# Patient Record
Sex: FEMALE | Race: WHITE | NOT HISPANIC OR LATINO | URBAN - METROPOLITAN AREA
[De-identification: names, ages, dates, MRNs, and addresses within clinical notes are randomized per-mention and may not be internally consistent; named-entity substitution may affect disease eponyms.]

---

## 2019-02-08 ENCOUNTER — LAB REQUISITION (OUTPATIENT)
Dept: LAB | Facility: HOSPITAL | Age: 29
End: 2019-02-08

## 2019-02-08 DIAGNOSIS — Z00.00 ROUTINE GENERAL MEDICAL EXAMINATION AT A HEALTH CARE FACILITY: ICD-10-CM

## 2019-02-08 LAB
ALP SERPL-CCNC: 175 U/L (ref 25–100)
ALT SERPL W P-5'-P-CCNC: 19 U/L (ref 7–40)
AST SERPL-CCNC: 26 U/L (ref 0–33)
BASOPHILS # BLD AUTO: 0.03 10*3/MM3 (ref 0–0.2)
BASOPHILS NFR BLD AUTO: 0.5 % (ref 0–1)
BILIRUB SERPL-MCNC: 0.4 MG/DL (ref 0.3–1.2)
CREAT BLD-MCNC: 0.71 MG/DL (ref 0.6–1.3)
DEPRECATED RDW RBC AUTO: 40.9 FL (ref 37–54)
EOSINOPHIL # BLD AUTO: 0.12 10*3/MM3 (ref 0–0.3)
EOSINOPHIL NFR BLD AUTO: 1.9 % (ref 0–3)
ERYTHROCYTE [DISTWIDTH] IN BLOOD BY AUTOMATED COUNT: 12.7 % (ref 11.3–14.5)
GFR SERPL CREATININE-BSD FRML MDRD: 120 ML/MIN/1.73
GFR SERPL CREATININE-BSD FRML MDRD: 99 ML/MIN/1.73
HCT VFR BLD AUTO: 39 % (ref 34.5–44)
HGB BLD-MCNC: 12.9 G/DL (ref 11.5–15.5)
IMM GRANULOCYTES # BLD AUTO: 0.02 10*3/MM3 (ref 0–0.03)
IMM GRANULOCYTES NFR BLD AUTO: 0.3 % (ref 0–0.6)
LDH SERPL-CCNC: 215 U/L (ref 120–246)
LYMPHOCYTES # BLD AUTO: 1.33 10*3/MM3 (ref 0.6–4.8)
LYMPHOCYTES NFR BLD AUTO: 20.7 % (ref 24–44)
MCH RBC QN AUTO: 29.4 PG (ref 27–31)
MCHC RBC AUTO-ENTMCNC: 33.1 G/DL (ref 32–36)
MCV RBC AUTO: 88.8 FL (ref 80–99)
MONOCYTES # BLD AUTO: 0.93 10*3/MM3 (ref 0–1)
MONOCYTES NFR BLD AUTO: 14.5 % (ref 0–12)
NEUTROPHILS # BLD AUTO: 4.01 10*3/MM3 (ref 1.5–8.3)
NEUTROPHILS NFR BLD AUTO: 62.4 % (ref 41–71)
PLATELET # BLD AUTO: 197 10*3/MM3 (ref 150–450)
PMV BLD AUTO: 12.1 FL (ref 6–12)
RBC # BLD AUTO: 4.39 10*6/MM3 (ref 3.89–5.14)
URATE SERPL-MCNC: 6.5 MG/DL (ref 3.1–7.8)
WBC NRBC COR # BLD: 6.42 10*3/MM3 (ref 3.5–10.8)

## 2019-02-08 PROCEDURE — 84450 TRANSFERASE (AST) (SGOT): CPT

## 2019-02-08 PROCEDURE — 84460 ALANINE AMINO (ALT) (SGPT): CPT

## 2019-02-08 PROCEDURE — 84550 ASSAY OF BLOOD/URIC ACID: CPT

## 2019-02-08 PROCEDURE — 85025 COMPLETE CBC W/AUTO DIFF WBC: CPT

## 2019-02-08 PROCEDURE — 82565 ASSAY OF CREATININE: CPT

## 2019-02-08 PROCEDURE — 83615 LACTATE (LD) (LDH) ENZYME: CPT

## 2019-02-08 PROCEDURE — 82247 BILIRUBIN TOTAL: CPT

## 2019-02-08 PROCEDURE — 84075 ASSAY ALKALINE PHOSPHATASE: CPT

## 2019-02-21 ENCOUNTER — ANESTHESIA EVENT (OUTPATIENT)
Dept: LABOR AND DELIVERY | Facility: HOSPITAL | Age: 29
End: 2019-02-21

## 2019-02-21 ENCOUNTER — HOSPITAL ENCOUNTER (INPATIENT)
Facility: HOSPITAL | Age: 29
LOS: 4 days | Discharge: HOME | End: 2019-02-25
Attending: OBSTETRICS & GYNECOLOGY | Admitting: OBSTETRICS & GYNECOLOGY

## 2019-02-21 ENCOUNTER — ANESTHESIA (OUTPATIENT)
Dept: LABOR AND DELIVERY | Facility: HOSPITAL | Age: 29
End: 2019-02-21

## 2019-02-21 PROBLEM — Z34.90 CURRENTLY PREGNANT: Status: ACTIVE | Noted: 2019-02-21

## 2019-02-21 LAB
ABO GROUP BLD: NORMAL
ALP SERPL-CCNC: 249 U/L (ref 25–100)
ALT SERPL W P-5'-P-CCNC: 56 U/L (ref 7–40)
AST SERPL-CCNC: 53 U/L (ref 0–33)
ATMOSPHERIC PRESS: ABNORMAL MMHG
ATMOSPHERIC PRESS: ABNORMAL MMHG
BASE EXCESS BLDCOA CALC-SCNC: -1.3 MMOL/L (ref 0–2)
BASE EXCESS BLDCOV CALC-SCNC: -1.8 MMOL/L (ref 0–2)
BDY SITE: ABNORMAL
BDY SITE: ABNORMAL
BILIRUB SERPL-MCNC: 0.4 MG/DL (ref 0.3–1.2)
BLD GP AB SCN SERPL QL: NEGATIVE
BODY TEMPERATURE: 37 C
BODY TEMPERATURE: 37 C
CO2 BLDA-SCNC: 28.3 MMOL/L (ref 23–27)
CO2 BLDA-SCNC: 29.9 MMOL/L (ref 23–27)
CREAT BLD-MCNC: 0.73 MG/DL (ref 0.6–1.3)
DEPRECATED RDW RBC AUTO: 39.7 FL (ref 37–54)
EPAP: 0
EPAP: 0
ERYTHROCYTE [DISTWIDTH] IN BLOOD BY AUTOMATED COUNT: 12.5 % (ref 11.3–14.5)
HCO3 BLDCOA-SCNC: 26.6 MMOL/L (ref 16.9–20.5)
HCO3 BLDCOV-SCNC: 27.9 MMOL/L (ref 18.6–21.4)
HCT VFR BLD AUTO: 40.9 % (ref 34.5–44)
HGB BLD-MCNC: 13.8 G/DL (ref 11.5–15.5)
HGB BLDA-MCNC: 15.4 G/DL (ref 14–18)
HGB BLDA-MCNC: 19.4 G/DL (ref 14–18)
HOROWITZ INDEX BLD+IHG-RTO: 21 %
HOROWITZ INDEX BLD+IHG-RTO: 21 %
IPAP: 0
IPAP: 0
LDH SERPL-CCNC: 209 U/L (ref 120–246)
Lab: ABNORMAL
MCH RBC QN AUTO: 29.4 PG (ref 27–31)
MCHC RBC AUTO-ENTMCNC: 33.7 G/DL (ref 32–36)
MCV RBC AUTO: 87.2 FL (ref 80–99)
MODALITY: ABNORMAL
MODALITY: ABNORMAL
NOTE: ABNORMAL
NOTE: ABNORMAL
NOTIFIED BY: ABNORMAL
NOTIFIED WHO: ABNORMAL
PAW @ PEAK INSP FLOW SETTING VENT: 0 CMH2O
PAW @ PEAK INSP FLOW SETTING VENT: 0 CMH2O
PCO2 BLDCOA: 56 MMHG (ref 43.3–54.9)
PCO2 BLDCOV: 65.1 MM HG
PH BLDCOA: 7.28 PH UNITS (ref 7.22–7.3)
PH BLDCOV: 7.24 PH UNITS
PLATELET # BLD AUTO: 226 10*3/MM3 (ref 150–450)
PMV BLD AUTO: 11.9 FL (ref 6–12)
PO2 BLDCOA: 17.3 MMHG (ref 11.5–43.3)
PO2 BLDCOV: 12.3 MM HG
RBC # BLD AUTO: 4.69 10*6/MM3 (ref 3.89–5.14)
RH BLD: POSITIVE
SAO2 % BLDCOA: 28 %
SAO2 % BLDCOA: ABNORMAL % (ref 92–98)
SAO2 % BLDCOV: 9.1 %
T&S EXPIRATION DATE: NORMAL
TOTAL RATE: 0 BREATHS/MINUTE
TOTAL RATE: 0 BREATHS/MINUTE
URATE SERPL-MCNC: 7.6 MG/DL (ref 3.1–7.8)
WBC NRBC COR # BLD: 5.94 10*3/MM3 (ref 3.5–10.8)

## 2019-02-21 PROCEDURE — 86900 BLOOD TYPING SEROLOGIC ABO: CPT | Performed by: OBSTETRICS & GYNECOLOGY

## 2019-02-21 PROCEDURE — 84450 TRANSFERASE (AST) (SGOT): CPT | Performed by: OBSTETRICS & GYNECOLOGY

## 2019-02-21 PROCEDURE — 84460 ALANINE AMINO (ALT) (SGPT): CPT | Performed by: OBSTETRICS & GYNECOLOGY

## 2019-02-21 PROCEDURE — 84075 ASSAY ALKALINE PHOSPHATASE: CPT | Performed by: OBSTETRICS & GYNECOLOGY

## 2019-02-21 PROCEDURE — 85027 COMPLETE CBC AUTOMATED: CPT | Performed by: OBSTETRICS & GYNECOLOGY

## 2019-02-21 PROCEDURE — 86900 BLOOD TYPING SEROLOGIC ABO: CPT

## 2019-02-21 PROCEDURE — 25010000003 MORPHINE PER 10 MG: Performed by: ANESTHESIOLOGY

## 2019-02-21 PROCEDURE — 25010000002 ONDANSETRON PER 1 MG: Performed by: OBSTETRICS & GYNECOLOGY

## 2019-02-21 PROCEDURE — 25010000002 MAGNESIUM SULFATE 2 GM/50ML SOLUTION: Performed by: OBSTETRICS & GYNECOLOGY

## 2019-02-21 PROCEDURE — 88307 TISSUE EXAM BY PATHOLOGIST: CPT | Performed by: OBSTETRICS & GYNECOLOGY

## 2019-02-21 PROCEDURE — 25010000003 CEFAZOLIN IN DEXTROSE 2-4 GM/100ML-% SOLUTION: Performed by: OBSTETRICS & GYNECOLOGY

## 2019-02-21 PROCEDURE — 25010000002 FENTANYL CITRATE (PF) 100 MCG/2ML SOLUTION: Performed by: ANESTHESIOLOGY

## 2019-02-21 PROCEDURE — 25010000002 METOCLOPRAMIDE PER 10 MG: Performed by: ANESTHESIOLOGY

## 2019-02-21 PROCEDURE — 59025 FETAL NON-STRESS TEST: CPT

## 2019-02-21 PROCEDURE — 83615 LACTATE (LD) (LDH) ENZYME: CPT | Performed by: OBSTETRICS & GYNECOLOGY

## 2019-02-21 PROCEDURE — 86850 RBC ANTIBODY SCREEN: CPT | Performed by: OBSTETRICS & GYNECOLOGY

## 2019-02-21 PROCEDURE — 82565 ASSAY OF CREATININE: CPT | Performed by: OBSTETRICS & GYNECOLOGY

## 2019-02-21 PROCEDURE — 84550 ASSAY OF BLOOD/URIC ACID: CPT | Performed by: OBSTETRICS & GYNECOLOGY

## 2019-02-21 PROCEDURE — 86901 BLOOD TYPING SEROLOGIC RH(D): CPT | Performed by: OBSTETRICS & GYNECOLOGY

## 2019-02-21 PROCEDURE — 86901 BLOOD TYPING SEROLOGIC RH(D): CPT

## 2019-02-21 PROCEDURE — 82805 BLOOD GASES W/O2 SATURATION: CPT

## 2019-02-21 PROCEDURE — 6A550ZT PHERESIS OF CORD BLOOD STEM CELLS, SINGLE: ICD-10-PCS | Performed by: OBSTETRICS & GYNECOLOGY

## 2019-02-21 PROCEDURE — 82247 BILIRUBIN TOTAL: CPT | Performed by: OBSTETRICS & GYNECOLOGY

## 2019-02-21 RX ORDER — PRENATAL WITH FERROUS FUM AND FOLIC ACID 3080; 920; 120; 400; 22; 1.84; 3; 20; 10; 1; 12; 200; 27; 25; 2 [IU]/1; [IU]/1; MG/1; [IU]/1; MG/1; MG/1; MG/1; MG/1; MG/1; MG/1; UG/1; MG/1; MG/1; MG/1; MG/1
1 TABLET ORAL DAILY
COMMUNITY

## 2019-02-21 RX ORDER — METOCLOPRAMIDE HYDROCHLORIDE 5 MG/ML
10 INJECTION INTRAMUSCULAR; INTRAVENOUS ONCE AS NEEDED
Status: DISCONTINUED | OUTPATIENT
Start: 2019-02-21 | End: 2019-02-25 | Stop reason: HOSPADM

## 2019-02-21 RX ORDER — ONDANSETRON 2 MG/ML
4 INJECTION INTRAMUSCULAR; INTRAVENOUS ONCE
Status: DISCONTINUED | OUTPATIENT
Start: 2019-02-22 | End: 2019-02-25 | Stop reason: HOSPADM

## 2019-02-21 RX ORDER — ONDANSETRON 2 MG/ML
4 INJECTION INTRAMUSCULAR; INTRAVENOUS EVERY 6 HOURS PRN
Status: DISCONTINUED | OUTPATIENT
Start: 2019-02-21 | End: 2019-02-25 | Stop reason: HOSPADM

## 2019-02-21 RX ORDER — CEFAZOLIN SODIUM 2 G/100ML
2 INJECTION, SOLUTION INTRAVENOUS ONCE
Status: COMPLETED | OUTPATIENT
Start: 2019-02-21 | End: 2019-02-21

## 2019-02-21 RX ORDER — ACETAMINOPHEN 160 MG/5ML
1000 SOLUTION ORAL EVERY 6 HOURS PRN
Status: DISCONTINUED | OUTPATIENT
Start: 2019-02-21 | End: 2019-02-21

## 2019-02-21 RX ORDER — DEXTROSE AND SODIUM CHLORIDE 5; .2 G/100ML; G/100ML
96 INJECTION, SOLUTION INTRAVENOUS CONTINUOUS
Status: DISCONTINUED | OUTPATIENT
Start: 2019-02-21 | End: 2019-02-25 | Stop reason: HOSPADM

## 2019-02-21 RX ORDER — IBUPROFEN 600 MG/1
600 TABLET ORAL ONCE AS NEEDED
Status: COMPLETED | OUTPATIENT
Start: 2019-02-21 | End: 2019-02-22

## 2019-02-21 RX ORDER — MAGNESIUM SULFATE HEPTAHYDRATE 40 MG/ML
2 INJECTION, SOLUTION INTRAVENOUS CONTINUOUS
Status: DISPENSED | OUTPATIENT
Start: 2019-02-21 | End: 2019-02-24

## 2019-02-21 RX ORDER — FENTANYL CITRATE 50 UG/ML
INJECTION, SOLUTION INTRAMUSCULAR; INTRAVENOUS
Status: COMPLETED | OUTPATIENT
Start: 2019-02-21 | End: 2019-02-21

## 2019-02-21 RX ORDER — MAGNESIUM SULF/RINGERS LACTATE 40 G/500ML
PLASTIC BAG, INJECTION (ML) INTRAVENOUS
Status: COMPLETED
Start: 2019-02-21 | End: 2019-02-21

## 2019-02-21 RX ORDER — OXYTOCIN 10 [USP'U]/ML
INJECTION, SOLUTION INTRAMUSCULAR; INTRAVENOUS AS NEEDED
Status: DISCONTINUED | OUTPATIENT
Start: 2019-02-21 | End: 2019-02-21 | Stop reason: SURG

## 2019-02-21 RX ORDER — METOCLOPRAMIDE HYDROCHLORIDE 5 MG/ML
INJECTION INTRAMUSCULAR; INTRAVENOUS AS NEEDED
Status: DISCONTINUED | OUTPATIENT
Start: 2019-02-21 | End: 2019-02-21 | Stop reason: SURG

## 2019-02-21 RX ORDER — ACETAMINOPHEN 325 MG/1
650 TABLET ORAL ONCE AS NEEDED
Status: COMPLETED | OUTPATIENT
Start: 2019-02-21 | End: 2019-02-22

## 2019-02-21 RX ORDER — MORPHINE SULFATE 0.5 MG/ML
INJECTION, SOLUTION EPIDURAL; INTRATHECAL; INTRAVENOUS
Status: COMPLETED | OUTPATIENT
Start: 2019-02-21 | End: 2019-02-21

## 2019-02-21 RX ORDER — HYDROMORPHONE HYDROCHLORIDE 1 MG/ML
0.5 INJECTION, SOLUTION INTRAMUSCULAR; INTRAVENOUS; SUBCUTANEOUS
Status: ACTIVE | OUTPATIENT
Start: 2019-02-21 | End: 2019-02-22

## 2019-02-21 RX ORDER — LABETALOL 200 MG/1
200 TABLET, FILM COATED ORAL 3 TIMES DAILY
COMMUNITY
End: 2019-02-25 | Stop reason: HOSPADM

## 2019-02-21 RX ORDER — FAMOTIDINE 10 MG/ML
20 INJECTION, SOLUTION INTRAVENOUS 2 TIMES DAILY PRN
Status: DISCONTINUED | OUTPATIENT
Start: 2019-02-21 | End: 2019-02-25 | Stop reason: HOSPADM

## 2019-02-21 RX ORDER — ACETAMINOPHEN 500 MG
1000 TABLET ORAL EVERY 6 HOURS PRN
Status: DISCONTINUED | OUTPATIENT
Start: 2019-02-21 | End: 2019-02-25 | Stop reason: HOSPADM

## 2019-02-21 RX ORDER — LABETALOL HYDROCHLORIDE 5 MG/ML
20-80 INJECTION, SOLUTION INTRAVENOUS
Status: ACTIVE | OUTPATIENT
Start: 2019-02-21 | End: 2019-02-22

## 2019-02-21 RX ORDER — LABETALOL HYDROCHLORIDE 5 MG/ML
INJECTION, SOLUTION INTRAVENOUS
Status: COMPLETED
Start: 2019-02-21 | End: 2019-02-21

## 2019-02-21 RX ORDER — SODIUM CHLORIDE, SODIUM LACTATE, POTASSIUM CHLORIDE, CALCIUM CHLORIDE 600; 310; 30; 20 MG/100ML; MG/100ML; MG/100ML; MG/100ML
1000 INJECTION, SOLUTION INTRAVENOUS ONCE
Status: COMPLETED | OUTPATIENT
Start: 2019-02-21 | End: 2019-02-21

## 2019-02-21 RX ORDER — SODIUM CHLORIDE, SODIUM LACTATE, POTASSIUM CHLORIDE, CALCIUM CHLORIDE 600; 310; 30; 20 MG/100ML; MG/100ML; MG/100ML; MG/100ML
INJECTION, SOLUTION INTRAVENOUS CONTINUOUS PRN
Status: DISCONTINUED | OUTPATIENT
Start: 2019-02-21 | End: 2019-02-21 | Stop reason: SURG

## 2019-02-21 RX ORDER — RANITIDINE HCL 75 MG
75 TABLET ORAL 2 TIMES DAILY
COMMUNITY
End: 2019-02-25 | Stop reason: HOSPADM

## 2019-02-21 RX ORDER — BUPIVACAINE HYDROCHLORIDE 7.5 MG/ML
INJECTION, SOLUTION EPIDURAL; RETROBULBAR
Status: COMPLETED | OUTPATIENT
Start: 2019-02-21 | End: 2019-02-21

## 2019-02-21 RX ADMIN — FAMOTIDINE 20 MG: 10 INJECTION, SOLUTION INTRAVENOUS at 19:52

## 2019-02-21 RX ADMIN — SODIUM CITRATE AND CITRIC ACID MONOHYDRATE 30 ML: 500; 334 SOLUTION ORAL at 19:29

## 2019-02-21 RX ADMIN — LABETALOL HYDROCHLORIDE 20 MG: 5 INJECTION INTRAVENOUS at 18:21

## 2019-02-21 RX ADMIN — DEXTROSE AND SODIUM CHLORIDE 96 ML/HR: 5; 200 INJECTION, SOLUTION INTRAVENOUS at 18:18

## 2019-02-21 RX ADMIN — LABETALOL HYDROCHLORIDE 20 MG: 5 INJECTION INTRAVENOUS at 20:15

## 2019-02-21 RX ADMIN — BUPIVACAINE HYDROCHLORIDE 1.6 ML: 7.5 INJECTION, SOLUTION EPIDURAL; RETROBULBAR at 21:57

## 2019-02-21 RX ADMIN — FENTANYL CITRATE 20 MCG: 50 INJECTION, SOLUTION INTRAMUSCULAR; INTRAVENOUS at 21:57

## 2019-02-21 RX ADMIN — MORPHINE SULFATE 0.2 MG: 0.5 INJECTION, SOLUTION EPIDURAL; INTRATHECAL; INTRAVENOUS at 21:57

## 2019-02-21 RX ADMIN — MAGNESIUM SULFATE HEPTAHYDRATE 2 G: 40 INJECTION, SOLUTION INTRAVENOUS at 18:55

## 2019-02-21 RX ADMIN — ACETAMINOPHEN 1000 MG: 500 TABLET, FILM COATED ORAL at 19:52

## 2019-02-21 RX ADMIN — METOCLOPRAMIDE 10 MG: 5 INJECTION, SOLUTION INTRAMUSCULAR; INTRAVENOUS at 22:02

## 2019-02-21 RX ADMIN — ONDANSETRON 4 MG: 2 INJECTION INTRAMUSCULAR; INTRAVENOUS at 22:02

## 2019-02-21 RX ADMIN — OXYTOCIN 20 UNITS: 10 INJECTION, SOLUTION INTRAMUSCULAR; INTRAVENOUS at 22:25

## 2019-02-21 RX ADMIN — SODIUM CHLORIDE, POTASSIUM CHLORIDE, SODIUM LACTATE AND CALCIUM CHLORIDE 1000 ML: 600; 310; 30; 20 INJECTION, SOLUTION INTRAVENOUS at 21:20

## 2019-02-21 RX ADMIN — LABETALOL HYDROCHLORIDE 40 MG: 5 INJECTION INTRAVENOUS at 18:32

## 2019-02-21 RX ADMIN — CEFAZOLIN SODIUM 2 G: 2 INJECTION, SOLUTION INTRAVENOUS at 19:28

## 2019-02-21 RX ADMIN — LABETALOL HYDROCHLORIDE 40 MG: 5 INJECTION INTRAVENOUS at 20:28

## 2019-02-21 RX ADMIN — FAMOTIDINE 20 MG: 10 INJECTION, SOLUTION INTRAVENOUS at 22:02

## 2019-02-21 RX ADMIN — ONDANSETRON 4 MG: 2 INJECTION INTRAMUSCULAR; INTRAVENOUS at 19:52

## 2019-02-21 RX ADMIN — OXYTOCIN 20 UNITS: 10 INJECTION, SOLUTION INTRAMUSCULAR; INTRAVENOUS at 22:13

## 2019-02-21 RX ADMIN — Medication 4 G: at 18:40

## 2019-02-21 RX ADMIN — SODIUM CHLORIDE, POTASSIUM CHLORIDE, SODIUM LACTATE AND CALCIUM CHLORIDE: 600; 310; 30; 20 INJECTION, SOLUTION INTRAVENOUS at 21:45

## 2019-02-22 LAB
BASOPHILS # BLD AUTO: 0.03 10*3/MM3 (ref 0–0.2)
BASOPHILS NFR BLD AUTO: 0.4 % (ref 0–1)
DEPRECATED RDW RBC AUTO: 39.5 FL (ref 37–54)
EOSINOPHIL # BLD AUTO: 0.06 10*3/MM3 (ref 0–0.3)
EOSINOPHIL NFR BLD AUTO: 0.7 % (ref 0–3)
ERYTHROCYTE [DISTWIDTH] IN BLOOD BY AUTOMATED COUNT: 12.3 % (ref 11.3–14.5)
HCT VFR BLD AUTO: 37.2 % (ref 34.5–44)
HGB BLD-MCNC: 12.8 G/DL (ref 11.5–15.5)
IMM GRANULOCYTES # BLD AUTO: 0.01 10*3/MM3 (ref 0–0.05)
IMM GRANULOCYTES NFR BLD AUTO: 0.1 % (ref 0–0.6)
LYMPHOCYTES # BLD AUTO: 2.24 10*3/MM3 (ref 0.6–4.8)
LYMPHOCYTES NFR BLD AUTO: 28 % (ref 24–44)
MCH RBC QN AUTO: 29.7 PG (ref 27–31)
MCHC RBC AUTO-ENTMCNC: 34.4 G/DL (ref 32–36)
MCV RBC AUTO: 86.3 FL (ref 80–99)
MONOCYTES # BLD AUTO: 0.57 10*3/MM3 (ref 0–1)
MONOCYTES NFR BLD AUTO: 7.1 % (ref 0–12)
NEUTROPHILS # BLD AUTO: 5.1 10*3/MM3 (ref 1.5–8.3)
NEUTROPHILS NFR BLD AUTO: 63.7 % (ref 41–71)
PLATELET # BLD AUTO: 215 10*3/MM3 (ref 150–450)
PMV BLD AUTO: 11.5 FL (ref 6–12)
RBC # BLD AUTO: 4.31 10*6/MM3 (ref 3.89–5.14)
WBC NRBC COR # BLD: 8.01 10*3/MM3 (ref 3.5–10.8)

## 2019-02-22 PROCEDURE — 85025 COMPLETE CBC W/AUTO DIFF WBC: CPT | Performed by: OBSTETRICS & GYNECOLOGY

## 2019-02-22 PROCEDURE — 25010000002 MAGNESIUM SULFATE 2 GM/50ML SOLUTION: Performed by: OBSTETRICS & GYNECOLOGY

## 2019-02-22 RX ORDER — SIMETHICONE 80 MG
80 TABLET,CHEWABLE ORAL
Status: DISCONTINUED | OUTPATIENT
Start: 2019-02-22 | End: 2019-02-25 | Stop reason: HOSPADM

## 2019-02-22 RX ORDER — MAGNESIUM SULF/RINGERS LACTATE 40 G/500ML
PLASTIC BAG, INJECTION (ML) INTRAVENOUS
Status: DISPENSED
Start: 2019-02-22 | End: 2019-02-23

## 2019-02-22 RX ORDER — LABETALOL 200 MG/1
200 TABLET, FILM COATED ORAL EVERY 8 HOURS SCHEDULED
Status: DISCONTINUED | OUTPATIENT
Start: 2019-02-22 | End: 2019-02-23

## 2019-02-22 RX ORDER — SODIUM CHLORIDE 0.9 % (FLUSH) 0.9 %
3-10 SYRINGE (ML) INJECTION AS NEEDED
Status: DISCONTINUED | OUTPATIENT
Start: 2019-02-22 | End: 2019-02-25 | Stop reason: HOSPADM

## 2019-02-22 RX ORDER — DOCUSATE SODIUM 100 MG/1
100 CAPSULE, LIQUID FILLED ORAL 2 TIMES DAILY PRN
Status: DISCONTINUED | OUTPATIENT
Start: 2019-02-22 | End: 2019-02-25 | Stop reason: HOSPADM

## 2019-02-22 RX ORDER — LANOLIN 100 %
OINTMENT (GRAM) TOPICAL
Status: DISCONTINUED | OUTPATIENT
Start: 2019-02-22 | End: 2019-02-25 | Stop reason: HOSPADM

## 2019-02-22 RX ORDER — NALOXONE HCL 0.4 MG/ML
0.4 VIAL (ML) INJECTION
Status: ACTIVE | OUTPATIENT
Start: 2019-02-22 | End: 2019-02-23

## 2019-02-22 RX ORDER — OXYCODONE HYDROCHLORIDE AND ACETAMINOPHEN 5; 325 MG/1; MG/1
1 TABLET ORAL EVERY 4 HOURS PRN
Status: DISCONTINUED | OUTPATIENT
Start: 2019-02-22 | End: 2019-02-25 | Stop reason: HOSPADM

## 2019-02-22 RX ORDER — PROMETHAZINE HYDROCHLORIDE 25 MG/ML
12.5 INJECTION, SOLUTION INTRAMUSCULAR; INTRAVENOUS EVERY 6 HOURS PRN
Status: DISCONTINUED | OUTPATIENT
Start: 2019-02-22 | End: 2019-02-25 | Stop reason: HOSPADM

## 2019-02-22 RX ORDER — PROMETHAZINE HYDROCHLORIDE 12.5 MG/1
12.5 SUPPOSITORY RECTAL EVERY 6 HOURS PRN
Status: DISCONTINUED | OUTPATIENT
Start: 2019-02-22 | End: 2019-02-25 | Stop reason: HOSPADM

## 2019-02-22 RX ORDER — LABETALOL 200 MG/1
200 TABLET, FILM COATED ORAL EVERY 8 HOURS SCHEDULED
Status: DISCONTINUED | OUTPATIENT
Start: 2019-02-22 | End: 2019-02-22

## 2019-02-22 RX ORDER — TRISODIUM CITRATE DIHYDRATE AND CITRIC ACID MONOHYDRATE 500; 334 MG/5ML; MG/5ML
30 SOLUTION ORAL ONCE
Status: COMPLETED | OUTPATIENT
Start: 2019-02-22 | End: 2019-02-21

## 2019-02-22 RX ORDER — PROMETHAZINE HYDROCHLORIDE 25 MG/1
25 TABLET ORAL EVERY 6 HOURS PRN
Status: DISCONTINUED | OUTPATIENT
Start: 2019-02-22 | End: 2019-02-25 | Stop reason: HOSPADM

## 2019-02-22 RX ORDER — IBUPROFEN 600 MG/1
600 TABLET ORAL EVERY 6 HOURS PRN
Status: DISCONTINUED | OUTPATIENT
Start: 2019-02-22 | End: 2019-02-25 | Stop reason: HOSPADM

## 2019-02-22 RX ORDER — SODIUM CHLORIDE 0.9 % (FLUSH) 0.9 %
3 SYRINGE (ML) INJECTION EVERY 12 HOURS SCHEDULED
Status: DISCONTINUED | OUTPATIENT
Start: 2019-02-22 | End: 2019-02-25 | Stop reason: HOSPADM

## 2019-02-22 RX ADMIN — MAGNESIUM SULFATE HEPTAHYDRATE 2 G: 40 INJECTION, SOLUTION INTRAVENOUS at 13:44

## 2019-02-22 RX ADMIN — IBUPROFEN 600 MG: 600 TABLET ORAL at 07:52

## 2019-02-22 RX ADMIN — LABETALOL HYDROCHLORIDE 20 MG: 5 INJECTION INTRAVENOUS at 15:11

## 2019-02-22 RX ADMIN — DEXTROSE AND SODIUM CHLORIDE 96 ML/HR: 5; 200 INJECTION, SOLUTION INTRAVENOUS at 11:22

## 2019-02-22 RX ADMIN — OXYCODONE AND ACETAMINOPHEN 1 TABLET: 5; 325 TABLET ORAL at 03:03

## 2019-02-22 RX ADMIN — LABETALOL HYDROCHLORIDE 200 MG: 200 TABLET, FILM COATED ORAL at 14:27

## 2019-02-22 RX ADMIN — DEXTROSE AND SODIUM CHLORIDE 96 ML/HR: 5; 200 INJECTION, SOLUTION INTRAVENOUS at 23:11

## 2019-02-22 RX ADMIN — ACETAMINOPHEN 650 MG: 325 TABLET ORAL at 12:29

## 2019-02-22 RX ADMIN — LABETALOL HYDROCHLORIDE 200 MG: 200 TABLET, FILM COATED ORAL at 23:17

## 2019-02-22 RX ADMIN — SIMETHICONE 80 MG: 80 TABLET, CHEWABLE ORAL at 07:50

## 2019-02-22 RX ADMIN — SIMETHICONE 80 MG: 80 TABLET, CHEWABLE ORAL at 12:30

## 2019-02-22 RX ADMIN — SIMETHICONE 80 MG: 80 TABLET, CHEWABLE ORAL at 21:12

## 2019-02-22 RX ADMIN — DEXTROSE AND SODIUM CHLORIDE 96 ML/HR: 5; 200 INJECTION, SOLUTION INTRAVENOUS at 01:20

## 2019-02-22 NOTE — ANESTHESIA POSTPROCEDURE EVALUATION
Patient: Gregoria Miguel    Procedure Summary     Date:  19 Room / Location:  [facility] LABOR DELIVERY   [facility]    Anesthesia Start:   Anesthesia Stop:      Procedure:   SECTION REPEAT (N/A Abdomen) Diagnosis:      Surgeon:  Kaylee Mendoza MD Provider:  Saturnino Cruz MD    Anesthesia Type:  spinal ASA Status:  3 - Emergent          Anesthesia Type: spinal  Last vitals  BP   122/64 (19)   Temp   97.5 °F (36.4 °C) (19)   Pulse   66 (19)   Resp   16 (19)     SpO2   98 % (19)     Post Anesthesia Care and Evaluation    Patient location during evaluation: bedside  Patient participation: complete - patient participated  Level of consciousness: awake and alert  Pain score: 0  Pain management: adequate  Airway patency: patent  Anesthetic complications: No anesthetic complications    Cardiovascular status: acceptable  Respiratory status: acceptable  Hydration status: acceptable

## 2019-02-22 NOTE — ANESTHESIA PREPROCEDURE EVALUATION
Anesthesia Evaluation     Patient summary reviewed and Nursing notes reviewed                Airway   Mallampati: I  TM distance: >3 FB  Neck ROM: full  No difficulty expected  Dental - normal exam     Pulmonary - negative pulmonary ROS and normal exam   Cardiovascular - negative cardio ROS and normal exam        Neuro/Psych- negative ROS  GI/Hepatic/Renal/Endo - negative ROS     Musculoskeletal (-) negative ROS    Abdominal  - normal exam    Bowel sounds: normal.   Substance History - negative use     OB/GYN    (+) Pregnant, pregnancy induced hypertension        Other                        Anesthesia Plan    ASA 3 - emergent     spinal

## 2019-02-22 NOTE — ANESTHESIA POSTPROCEDURE EVALUATION
Patient: Gregoria Miguel    Procedure Summary     Date:  19 Room / Location:  [facility] LABOR DELIVERY   [facility]    Anesthesia Start:   Anesthesia Stop:      Procedure:   SECTION REPEAT (N/A Abdomen) Diagnosis:      Surgeon:  Kaylee Mendoza MD Provider:  Saturnino Cruz MD    Anesthesia Type:  spinal ASA Status:  3 - Emergent          Anesthesia Type: spinal  Last vitals  BP   155/82 (19 1007)   Temp   97.4 °F (36.3 °C) (19 0800)   Pulse   67 (19 1007)   Resp   16 (19 0607)     SpO2   98 % (19 3557)     Post Anesthesia Care and Evaluation    Patient location during evaluation: bedside  Patient participation: complete - patient participated  Level of consciousness: awake and alert  Pain management: adequate  Airway patency: patent  Anesthetic complications: No anesthetic complications    Cardiovascular status: acceptable  Respiratory status: acceptable  Hydration status: acceptable  Post Neuraxial Block status: Motor and sensory function returned to baseline and No signs or symptoms of PDPH

## 2019-02-22 NOTE — ANESTHESIA PROCEDURE NOTES
Spinal Block      Start Time: 2/21/2019 9:56 PM  Stop Time: 2/21/2019 9:57 PM  Indication:at surgeon's request  Performed By  Anesthesiologist: Saturnino Cruz MD  Preanesthetic Checklist  Completed: patient identified, site marked, surgical consent, pre-op evaluation, timeout performed, IV checked, risks and benefits discussed and monitors and equipment checked  Spinal Block Prep:  Sterile Tech:cap, gloves, sterile barriers and mask  Prep:Betadine  Patient Monitoring:EKG, continuous pulse oximetry and blood pressure monitoring  Spinal Block Procedure  Approach:midline  Location:L2-L3  Needle Type:Christy  Needle Gauge:25 G  Placement of Spinal needle event:cerebrospinal fluid aspirated  Paresthesia: no  Fluid Appearance:clear  Medications: bupivacaine PF (MARCAINE) 0.75 % injection Hyperbaric, 1.6 mL  morphine PF (ASTRAMORPH) injection, 0.2 mg  fentaNYL citrate (PF) (SUBLIMAZE) injection, 20 mcg  Med Administered at 2/21/2019 9:57 PM   Post Assessment  Patient Tolerance:patient tolerated the procedure well with no apparent complications  Complications no

## 2019-02-23 LAB
ALP SERPL-CCNC: 211 U/L (ref 25–100)
ALT SERPL W P-5'-P-CCNC: 77 U/L (ref 7–40)
AST SERPL-CCNC: 68 U/L (ref 0–33)
BASOPHILS # BLD AUTO: 0.03 10*3/MM3 (ref 0–0.2)
BASOPHILS NFR BLD AUTO: 0.3 % (ref 0–1)
BILIRUB SERPL-MCNC: 0.3 MG/DL (ref 0.3–1.2)
CREAT BLD-MCNC: 0.69 MG/DL (ref 0.6–1.3)
DEPRECATED RDW RBC AUTO: 41.3 FL (ref 37–54)
EOSINOPHIL # BLD AUTO: 0.07 10*3/MM3 (ref 0–0.3)
EOSINOPHIL NFR BLD AUTO: 0.8 % (ref 0–3)
ERYTHROCYTE [DISTWIDTH] IN BLOOD BY AUTOMATED COUNT: 12.9 % (ref 11.3–14.5)
HCT VFR BLD AUTO: 36.9 % (ref 34.5–44)
HGB BLD-MCNC: 12.4 G/DL (ref 11.5–15.5)
IMM GRANULOCYTES # BLD AUTO: 0.01 10*3/MM3 (ref 0–0.05)
IMM GRANULOCYTES NFR BLD AUTO: 0.1 % (ref 0–0.6)
LDH SERPL-CCNC: 354 U/L (ref 120–246)
LYMPHOCYTES # BLD AUTO: 1.25 10*3/MM3 (ref 0.6–4.8)
LYMPHOCYTES NFR BLD AUTO: 14.2 % (ref 24–44)
MAGNESIUM SERPL-MCNC: 6.6 MG/DL (ref 1.3–2.7)
MCH RBC QN AUTO: 29.9 PG (ref 27–31)
MCHC RBC AUTO-ENTMCNC: 33.6 G/DL (ref 32–36)
MCV RBC AUTO: 88.9 FL (ref 80–99)
MONOCYTES # BLD AUTO: 0.6 10*3/MM3 (ref 0–1)
MONOCYTES NFR BLD AUTO: 6.8 % (ref 0–12)
NEUTROPHILS # BLD AUTO: 6.83 10*3/MM3 (ref 1.5–8.3)
NEUTROPHILS NFR BLD AUTO: 77.8 % (ref 41–71)
PLATELET # BLD AUTO: 244 10*3/MM3 (ref 150–450)
PMV BLD AUTO: 11.1 FL (ref 6–12)
RBC # BLD AUTO: 4.15 10*6/MM3 (ref 3.89–5.14)
URATE SERPL-MCNC: 7.1 MG/DL (ref 3.1–7.8)
WBC NRBC COR # BLD: 8.79 10*3/MM3 (ref 3.5–10.8)

## 2019-02-23 PROCEDURE — 84075 ASSAY ALKALINE PHOSPHATASE: CPT | Performed by: OBSTETRICS & GYNECOLOGY

## 2019-02-23 PROCEDURE — 84460 ALANINE AMINO (ALT) (SGPT): CPT | Performed by: OBSTETRICS & GYNECOLOGY

## 2019-02-23 PROCEDURE — 25010000002 ONDANSETRON PER 1 MG: Performed by: OBSTETRICS & GYNECOLOGY

## 2019-02-23 PROCEDURE — 85025 COMPLETE CBC W/AUTO DIFF WBC: CPT | Performed by: OBSTETRICS & GYNECOLOGY

## 2019-02-23 PROCEDURE — 82565 ASSAY OF CREATININE: CPT | Performed by: OBSTETRICS & GYNECOLOGY

## 2019-02-23 PROCEDURE — 84450 TRANSFERASE (AST) (SGOT): CPT | Performed by: OBSTETRICS & GYNECOLOGY

## 2019-02-23 PROCEDURE — 82247 BILIRUBIN TOTAL: CPT | Performed by: OBSTETRICS & GYNECOLOGY

## 2019-02-23 PROCEDURE — 25010000002 BUTORPHANOL PER 1 MG: Performed by: OBSTETRICS & GYNECOLOGY

## 2019-02-23 PROCEDURE — 83735 ASSAY OF MAGNESIUM: CPT | Performed by: OBSTETRICS & GYNECOLOGY

## 2019-02-23 PROCEDURE — 84550 ASSAY OF BLOOD/URIC ACID: CPT | Performed by: OBSTETRICS & GYNECOLOGY

## 2019-02-23 PROCEDURE — 83615 LACTATE (LD) (LDH) ENZYME: CPT | Performed by: OBSTETRICS & GYNECOLOGY

## 2019-02-23 RX ORDER — NIFEDIPINE 10 MG/1
10 CAPSULE ORAL ONCE
Status: COMPLETED | OUTPATIENT
Start: 2019-02-23 | End: 2019-02-23

## 2019-02-23 RX ORDER — DIPHENHYDRAMINE HYDROCHLORIDE, ZINC ACETATE 2; .1 G/100G; G/100G
CREAM TOPICAL 3 TIMES DAILY PRN
Status: DISCONTINUED | OUTPATIENT
Start: 2019-02-23 | End: 2019-02-25 | Stop reason: HOSPADM

## 2019-02-23 RX ORDER — LABETALOL 200 MG/1
300 TABLET, FILM COATED ORAL EVERY 8 HOURS SCHEDULED
Status: DISCONTINUED | OUTPATIENT
Start: 2019-02-23 | End: 2019-02-25 | Stop reason: HOSPADM

## 2019-02-23 RX ORDER — BUTORPHANOL TARTRATE 1 MG/ML
0.5 INJECTION, SOLUTION INTRAMUSCULAR; INTRAVENOUS ONCE
Status: COMPLETED | OUTPATIENT
Start: 2019-02-23 | End: 2019-02-23

## 2019-02-23 RX ADMIN — ACETAMINOPHEN 1000 MG: 500 TABLET, FILM COATED ORAL at 06:42

## 2019-02-23 RX ADMIN — IBUPROFEN 600 MG: 600 TABLET, FILM COATED ORAL at 18:59

## 2019-02-23 RX ADMIN — LABETALOL HCL 300 MG: 300 TABLET, FILM COATED ORAL at 14:15

## 2019-02-23 RX ADMIN — DOCUSATE SODIUM 100 MG: 100 CAPSULE, LIQUID FILLED ORAL at 22:06

## 2019-02-23 RX ADMIN — SIMETHICONE 80 MG: 80 TABLET, CHEWABLE ORAL at 11:51

## 2019-02-23 RX ADMIN — DIPHENHYDRAMINE HYDROCHLORIDE, ZINC ACETATE: 2; .1 CREAM TOPICAL at 22:06

## 2019-02-23 RX ADMIN — SIMETHICONE 80 MG: 80 TABLET, CHEWABLE ORAL at 22:07

## 2019-02-23 RX ADMIN — LABETALOL HCL 300 MG: 300 TABLET, FILM COATED ORAL at 22:07

## 2019-02-23 RX ADMIN — LABETALOL HCL 300 MG: 300 TABLET, FILM COATED ORAL at 06:13

## 2019-02-23 RX ADMIN — BUTORPHANOL TARTRATE 0.5 MG: 1 INJECTION, SOLUTION INTRAMUSCULAR; INTRAVENOUS at 09:19

## 2019-02-23 RX ADMIN — SIMETHICONE 80 MG: 80 TABLET, CHEWABLE ORAL at 08:45

## 2019-02-23 RX ADMIN — NIFEDIPINE 10 MG: 10 CAPSULE ORAL at 02:15

## 2019-02-23 RX ADMIN — DEXTROSE AND SODIUM CHLORIDE 96 ML/HR: 5; 200 INJECTION, SOLUTION INTRAVENOUS at 09:37

## 2019-02-23 RX ADMIN — ONDANSETRON 4 MG: 2 INJECTION INTRAMUSCULAR; INTRAVENOUS at 01:28

## 2019-02-24 LAB
ALP SERPL-CCNC: 212 U/L (ref 25–100)
ALT SERPL W P-5'-P-CCNC: 68 U/L (ref 7–40)
AST SERPL-CCNC: 47 U/L (ref 0–33)
BILIRUB SERPL-MCNC: 0.4 MG/DL (ref 0.3–1.2)
CREAT BLD-MCNC: 0.72 MG/DL (ref 0.6–1.3)
DEPRECATED RDW RBC AUTO: 43.8 FL (ref 37–54)
ERYTHROCYTE [DISTWIDTH] IN BLOOD BY AUTOMATED COUNT: 13.1 % (ref 11.3–14.5)
HCT VFR BLD AUTO: 38.4 % (ref 34.5–44)
HGB BLD-MCNC: 12.2 G/DL (ref 11.5–15.5)
LDH SERPL-CCNC: 326 U/L (ref 120–246)
MCH RBC QN AUTO: 29.3 PG (ref 27–31)
MCHC RBC AUTO-ENTMCNC: 31.8 G/DL (ref 32–36)
MCV RBC AUTO: 92.1 FL (ref 80–99)
PLATELET # BLD AUTO: 275 10*3/MM3 (ref 150–450)
PMV BLD AUTO: 11.1 FL (ref 6–12)
RBC # BLD AUTO: 4.17 10*6/MM3 (ref 3.89–5.14)
URATE SERPL-MCNC: 6.7 MG/DL (ref 3.1–7.8)
WBC NRBC COR # BLD: 8.69 10*3/MM3 (ref 3.5–10.8)

## 2019-02-24 PROCEDURE — 84550 ASSAY OF BLOOD/URIC ACID: CPT | Performed by: OBSTETRICS & GYNECOLOGY

## 2019-02-24 PROCEDURE — 84450 TRANSFERASE (AST) (SGOT): CPT | Performed by: OBSTETRICS & GYNECOLOGY

## 2019-02-24 PROCEDURE — 84460 ALANINE AMINO (ALT) (SGPT): CPT | Performed by: OBSTETRICS & GYNECOLOGY

## 2019-02-24 PROCEDURE — 85027 COMPLETE CBC AUTOMATED: CPT | Performed by: OBSTETRICS & GYNECOLOGY

## 2019-02-24 PROCEDURE — 83615 LACTATE (LD) (LDH) ENZYME: CPT | Performed by: OBSTETRICS & GYNECOLOGY

## 2019-02-24 PROCEDURE — 84075 ASSAY ALKALINE PHOSPHATASE: CPT | Performed by: OBSTETRICS & GYNECOLOGY

## 2019-02-24 PROCEDURE — 82247 BILIRUBIN TOTAL: CPT | Performed by: OBSTETRICS & GYNECOLOGY

## 2019-02-24 PROCEDURE — 82565 ASSAY OF CREATININE: CPT | Performed by: OBSTETRICS & GYNECOLOGY

## 2019-02-24 RX ORDER — NIFEDIPINE 10 MG/1
10 CAPSULE ORAL ONCE
Status: COMPLETED | OUTPATIENT
Start: 2019-02-24 | End: 2019-02-24

## 2019-02-24 RX ORDER — NIFEDIPINE 30 MG/1
30 TABLET, EXTENDED RELEASE ORAL 2 TIMES DAILY
Status: DISCONTINUED | OUTPATIENT
Start: 2019-02-24 | End: 2019-02-25 | Stop reason: HOSPADM

## 2019-02-24 RX ADMIN — NIFEDIPINE 10 MG: 10 CAPSULE ORAL at 17:25

## 2019-02-24 RX ADMIN — LABETALOL HCL 300 MG: 300 TABLET, FILM COATED ORAL at 06:19

## 2019-02-24 RX ADMIN — IBUPROFEN 600 MG: 600 TABLET, FILM COATED ORAL at 13:38

## 2019-02-24 RX ADMIN — SIMETHICONE 80 MG: 80 TABLET, CHEWABLE ORAL at 22:09

## 2019-02-24 RX ADMIN — LABETALOL HCL 300 MG: 300 TABLET, FILM COATED ORAL at 13:34

## 2019-02-24 RX ADMIN — IBUPROFEN 600 MG: 600 TABLET, FILM COATED ORAL at 08:06

## 2019-02-24 RX ADMIN — NIFEDIPINE 30 MG: 30 TABLET, FILM COATED, EXTENDED RELEASE ORAL at 18:18

## 2019-02-24 RX ADMIN — SIMETHICONE 80 MG: 80 TABLET, CHEWABLE ORAL at 08:06

## 2019-02-24 RX ADMIN — DOCUSATE SODIUM 100 MG: 100 CAPSULE, LIQUID FILLED ORAL at 22:09

## 2019-02-24 RX ADMIN — SIMETHICONE 80 MG: 80 TABLET, CHEWABLE ORAL at 13:34

## 2019-02-24 RX ADMIN — IBUPROFEN 600 MG: 600 TABLET, FILM COATED ORAL at 22:09

## 2019-02-24 RX ADMIN — SIMETHICONE 80 MG: 80 TABLET, CHEWABLE ORAL at 17:05

## 2019-02-24 RX ADMIN — DOCUSATE SODIUM 100 MG: 100 CAPSULE, LIQUID FILLED ORAL at 08:06

## 2019-02-24 RX ADMIN — LABETALOL HCL 300 MG: 300 TABLET, FILM COATED ORAL at 22:10

## 2019-02-25 VITALS
HEART RATE: 77 BPM | WEIGHT: 199 LBS | DIASTOLIC BLOOD PRESSURE: 80 MMHG | OXYGEN SATURATION: 99 % | TEMPERATURE: 98.4 F | SYSTOLIC BLOOD PRESSURE: 133 MMHG | RESPIRATION RATE: 16 BRPM

## 2019-02-25 LAB
CYTO UR: NORMAL
LAB AP CASE REPORT: NORMAL
LAB AP CLINICAL INFORMATION: NORMAL
PATH REPORT.FINAL DX SPEC: NORMAL
PATH REPORT.GROSS SPEC: NORMAL

## 2019-02-25 RX ORDER — IBUPROFEN 600 MG/1
600 TABLET ORAL EVERY 6 HOURS PRN
Qty: 30 TABLET | Refills: 0 | Status: SHIPPED | OUTPATIENT
Start: 2019-02-25

## 2019-02-25 RX ORDER — OXYCODONE HYDROCHLORIDE AND ACETAMINOPHEN 5; 325 MG/1; MG/1
1 TABLET ORAL EVERY 6 HOURS PRN
Qty: 5 TABLET | Refills: 0 | Status: SHIPPED | OUTPATIENT
Start: 2019-02-25 | End: 2019-02-28

## 2019-02-25 RX ORDER — PSEUDOEPHEDRINE HCL 30 MG
100 TABLET ORAL 2 TIMES DAILY PRN
Start: 2019-02-25

## 2019-02-25 RX ORDER — LABETALOL 300 MG/1
300 TABLET, FILM COATED ORAL EVERY 8 HOURS SCHEDULED
Qty: 90 TABLET | Refills: 1 | Status: SHIPPED | OUTPATIENT
Start: 2019-02-25 | End: 2019-02-26

## 2019-02-25 RX ORDER — NIFEDIPINE 30 MG/1
30 TABLET, FILM COATED, EXTENDED RELEASE ORAL 2 TIMES DAILY
Qty: 60 TABLET | Refills: 1 | Status: SHIPPED | OUTPATIENT
Start: 2019-02-25 | End: 2019-02-26

## 2019-02-25 RX ADMIN — SIMETHICONE 80 MG: 80 TABLET, CHEWABLE ORAL at 07:21

## 2019-02-25 RX ADMIN — IBUPROFEN 600 MG: 600 TABLET, FILM COATED ORAL at 05:36

## 2019-02-25 RX ADMIN — LABETALOL HCL 300 MG: 300 TABLET, FILM COATED ORAL at 05:36

## 2019-02-25 RX ADMIN — NIFEDIPINE 30 MG: 30 TABLET, FILM COATED, EXTENDED RELEASE ORAL at 07:21

## 2019-02-26 RX ORDER — LABETALOL 300 MG/1
300 TABLET, FILM COATED ORAL EVERY 8 HOURS SCHEDULED
Qty: 90 TABLET | Refills: 0 | Status: SHIPPED | OUTPATIENT
Start: 2019-02-26

## 2019-02-26 RX ORDER — NIFEDIPINE 30 MG/1
30 TABLET, FILM COATED, EXTENDED RELEASE ORAL 2 TIMES DAILY
Qty: 60 TABLET | Refills: 0 | Status: SHIPPED | OUTPATIENT
Start: 2019-02-26 | End: 2021-07-12

## 2021-02-18 LAB
EXTERNAL CHLAMYDIA SCREEN: NORMAL
EXTERNAL GONORRHEA SCREEN: NORMAL
EXTERNAL HEPATITIS B SURFACE ANTIGEN: NEGATIVE
EXTERNAL HEPATITIS C AB: NEGATIVE
EXTERNAL RUBELLA QUALITATIVE: NORMAL
EXTERNAL SYPHILIS RPR SCREEN: NORMAL
HIV1 P24 AG SERPL QL IA: NORMAL
VZV IGG SER QL: NORMAL

## 2021-06-02 ENCOUNTER — TRANSCRIBE ORDERS (OUTPATIENT)
Dept: OBSTETRICS AND GYNECOLOGY | Facility: HOSPITAL | Age: 31
End: 2021-06-02

## 2021-06-02 DIAGNOSIS — O28.3 ECHOGENIC INTRACARDIAC FOCUS OF FETUS ON PRENATAL ULTRASOUND: ICD-10-CM

## 2021-06-02 DIAGNOSIS — O10.019 PRE-EXISTING ESSENTIAL HTN COMP PREGNANCY, UNSP TRIMESTER: Primary | ICD-10-CM

## 2021-06-02 DIAGNOSIS — O34.219 DESIRES VBAC (VAGINAL BIRTH AFTER CESAREAN) TRIAL: ICD-10-CM

## 2021-06-02 DIAGNOSIS — O09.899 HISTORY OF PRETERM DELIVERY, CURRENTLY PREGNANT: ICD-10-CM

## 2021-06-02 DIAGNOSIS — Z98.891 HISTORY OF C-SECTION: ICD-10-CM

## 2021-06-14 ENCOUNTER — HOSPITAL ENCOUNTER (OUTPATIENT)
Dept: WOMENS IMAGING | Facility: HOSPITAL | Age: 31
Discharge: HOME | End: 2021-06-14
Admitting: OBSTETRICS & GYNECOLOGY

## 2021-06-14 ENCOUNTER — OFFICE VISIT (OUTPATIENT)
Dept: OBSTETRICS AND GYNECOLOGY | Facility: HOSPITAL | Age: 31
End: 2021-06-14

## 2021-06-14 VITALS
BODY MASS INDEX: 30.13 KG/M2 | SYSTOLIC BLOOD PRESSURE: 140 MMHG | HEIGHT: 67 IN | DIASTOLIC BLOOD PRESSURE: 84 MMHG | WEIGHT: 192 LBS

## 2021-06-14 DIAGNOSIS — O10.919 CHRONIC HYPERTENSION IN PREGNANCY: Primary | ICD-10-CM

## 2021-06-14 DIAGNOSIS — O09.899 HISTORY OF PRETERM DELIVERY, CURRENTLY PREGNANT: ICD-10-CM

## 2021-06-14 DIAGNOSIS — Z98.891 HISTORY OF C-SECTION: ICD-10-CM

## 2021-06-14 DIAGNOSIS — O34.219 DESIRES VBAC (VAGINAL BIRTH AFTER CESAREAN) TRIAL: ICD-10-CM

## 2021-06-14 DIAGNOSIS — Z34.90 PREGNANCY, UNSPECIFIED GESTATIONAL AGE: ICD-10-CM

## 2021-06-14 DIAGNOSIS — O35.BXX0 ECHOGENIC FOCUS OF HEART OF FETUS AFFECTING ANTEPARTUM CARE OF MOTHER, SINGLE OR UNSPECIFIED FETUS: ICD-10-CM

## 2021-06-14 DIAGNOSIS — O10.019 PRE-EXISTING ESSENTIAL HTN COMP PREGNANCY, UNSP TRIMESTER: ICD-10-CM

## 2021-06-14 DIAGNOSIS — O28.3 ECHOGENIC INTRACARDIAC FOCUS OF FETUS ON PRENATAL ULTRASOUND: ICD-10-CM

## 2021-06-14 DIAGNOSIS — Z98.891 PREVIOUS CESAREAN SECTION: ICD-10-CM

## 2021-06-14 PROCEDURE — 76811 OB US DETAILED SNGL FETUS: CPT | Performed by: OBSTETRICS & GYNECOLOGY

## 2021-06-14 PROCEDURE — 76811 OB US DETAILED SNGL FETUS: CPT

## 2021-06-14 RX ORDER — ASPIRIN 81 MG/1
81 TABLET ORAL DAILY
COMMUNITY
End: 2021-08-10 | Stop reason: HOSPADM

## 2021-06-14 NOTE — PROGRESS NOTES
Denies any complaints.  Reports NIPT low risk, male.  Next OB appt in one week.  Would like to TOLAC.

## 2021-07-12 ENCOUNTER — OFFICE VISIT (OUTPATIENT)
Dept: OBSTETRICS AND GYNECOLOGY | Facility: HOSPITAL | Age: 31
End: 2021-07-12

## 2021-07-12 ENCOUNTER — HOSPITAL ENCOUNTER (OUTPATIENT)
Dept: WOMENS IMAGING | Facility: HOSPITAL | Age: 31
Discharge: HOME | End: 2021-07-12
Admitting: OBSTETRICS & GYNECOLOGY

## 2021-07-12 VITALS — BODY MASS INDEX: 30.84 KG/M2 | DIASTOLIC BLOOD PRESSURE: 82 MMHG | WEIGHT: 194 LBS | SYSTOLIC BLOOD PRESSURE: 137 MMHG

## 2021-07-12 DIAGNOSIS — O10.919 CHRONIC HYPERTENSION IN PREGNANCY: Primary | ICD-10-CM

## 2021-07-12 DIAGNOSIS — Z98.891 PREVIOUS CESAREAN SECTION: ICD-10-CM

## 2021-07-12 DIAGNOSIS — Z34.90 PREGNANCY, UNSPECIFIED GESTATIONAL AGE: ICD-10-CM

## 2021-07-12 DIAGNOSIS — O10.919 CHRONIC HYPERTENSION IN PREGNANCY: ICD-10-CM

## 2021-07-12 DIAGNOSIS — O35.BXX0 ECHOGENIC FOCUS OF HEART OF FETUS AFFECTING ANTEPARTUM CARE OF MOTHER, SINGLE OR UNSPECIFIED FETUS: ICD-10-CM

## 2021-07-12 PROCEDURE — 76816 OB US FOLLOW-UP PER FETUS: CPT

## 2021-07-12 PROCEDURE — 76816 OB US FOLLOW-UP PER FETUS: CPT | Performed by: OBSTETRICS & GYNECOLOGY

## 2021-07-12 NOTE — PROGRESS NOTES
Documentation of the ultasound findings, images, and interpretations will be available in the patient's Viewpoint report located in the Chart Review Imaging tab in [employer].

## 2021-08-04 ENCOUNTER — HOSPITAL ENCOUNTER (INPATIENT)
Facility: HOSPITAL | Age: 31
LOS: 6 days | Discharge: HOME | End: 2021-08-10
Attending: OBSTETRICS & GYNECOLOGY | Admitting: OBSTETRICS & GYNECOLOGY

## 2021-08-04 DIAGNOSIS — Z98.891 STATUS POST CESAREAN SECTION: Primary | ICD-10-CM

## 2021-08-04 PROBLEM — O11.9 CHRONIC HYPERTENSION WITH SUPERIMPOSED PREECLAMPSIA: Status: ACTIVE | Noted: 2021-08-04

## 2021-08-04 LAB
ABO GROUP BLD: NORMAL
ALP SERPL-CCNC: 148 U/L (ref 39–117)
ALT SERPL W P-5'-P-CCNC: 15 U/L (ref 1–33)
AMPHET+METHAMPHET UR QL: NEGATIVE
AMPHETAMINES UR QL: NEGATIVE
AST SERPL-CCNC: 23 U/L (ref 1–32)
BARBITURATES UR QL SCN: NEGATIVE
BENZODIAZ UR QL SCN: NEGATIVE
BILIRUB SERPL-MCNC: 0.4 MG/DL (ref 0–1.2)
BILIRUB UR QL STRIP: NEGATIVE
BLD GP AB SCN SERPL QL: NEGATIVE
BUPRENORPHINE SERPL-MCNC: NEGATIVE NG/ML
CANNABINOIDS SERPL QL: NEGATIVE
CLARITY UR: CLEAR
COCAINE UR QL: NEGATIVE
COLOR UR: YELLOW
CREAT SERPL-MCNC: 0.6 MG/DL (ref 0.57–1)
CREAT SERPL-MCNC: 0.6 MG/DL (ref 0.57–1)
DEPRECATED RDW RBC AUTO: 40.6 FL (ref 37–54)
ERYTHROCYTE [DISTWIDTH] IN BLOOD BY AUTOMATED COUNT: 12.7 % (ref 12.3–15.4)
FIBRINOGEN PPP-MCNC: 466 MG/DL (ref 220–470)
GFR SERPL CREATININE-BSD FRML MDRD: 118 ML/MIN/1.73
GLUCOSE UR STRIP-MCNC: NEGATIVE MG/DL
HCT VFR BLD AUTO: 39.8 % (ref 34–46.6)
HGB BLD-MCNC: 13.7 G/DL (ref 12–15.9)
HGB UR QL STRIP.AUTO: NEGATIVE
KETONES UR QL STRIP: ABNORMAL
LDH SERPL-CCNC: 202 U/L (ref 135–214)
LEUKOCYTE ESTERASE UR QL STRIP.AUTO: NEGATIVE
MCH RBC QN AUTO: 30.4 PG (ref 26.6–33)
MCHC RBC AUTO-ENTMCNC: 34.4 G/DL (ref 31.5–35.7)
MCV RBC AUTO: 88.2 FL (ref 79–97)
METHADONE UR QL SCN: NEGATIVE
NITRITE UR QL STRIP: NEGATIVE
OPIATES UR QL: NEGATIVE
OXYCODONE UR QL SCN: NEGATIVE
PCP UR QL SCN: NEGATIVE
PH UR STRIP.AUTO: 5.5 [PH] (ref 5–8)
PLATELET # BLD AUTO: 239 10*3/MM3 (ref 140–450)
PMV BLD AUTO: 11 FL (ref 6–12)
PROPOXYPH UR QL: NEGATIVE
PROT UR QL STRIP: NEGATIVE
RBC # BLD AUTO: 4.51 10*6/MM3 (ref 3.77–5.28)
RH BLD: POSITIVE
SARS-COV-2 RDRP RESP QL NAA+PROBE: NORMAL
SP GR UR STRIP: 1.01 (ref 1–1.03)
T&S EXPIRATION DATE: NORMAL
TRICYCLICS UR QL SCN: NEGATIVE
URATE SERPL-MCNC: 6.8 MG/DL (ref 2.4–5.7)
UROBILINOGEN UR QL STRIP: ABNORMAL
WBC # BLD AUTO: 7.39 10*3/MM3 (ref 3.4–10.8)

## 2021-08-04 PROCEDURE — 81003 URINALYSIS AUTO W/O SCOPE: CPT | Performed by: OBSTETRICS & GYNECOLOGY

## 2021-08-04 PROCEDURE — 84460 ALANINE AMINO (ALT) (SGPT): CPT | Performed by: OBSTETRICS & GYNECOLOGY

## 2021-08-04 PROCEDURE — 99221 1ST HOSP IP/OBS SF/LOW 40: CPT | Performed by: OBSTETRICS & GYNECOLOGY

## 2021-08-04 PROCEDURE — 84450 TRANSFERASE (AST) (SGOT): CPT | Performed by: OBSTETRICS & GYNECOLOGY

## 2021-08-04 PROCEDURE — 82565 ASSAY OF CREATININE: CPT | Performed by: OBSTETRICS & GYNECOLOGY

## 2021-08-04 PROCEDURE — 86850 RBC ANTIBODY SCREEN: CPT | Performed by: OBSTETRICS & GYNECOLOGY

## 2021-08-04 PROCEDURE — 85027 COMPLETE CBC AUTOMATED: CPT | Performed by: OBSTETRICS & GYNECOLOGY

## 2021-08-04 PROCEDURE — 25010000003 MAGNESIUM SULFATE 20 GM/500ML SOLUTION

## 2021-08-04 PROCEDURE — 84075 ASSAY ALKALINE PHOSPHATASE: CPT | Performed by: OBSTETRICS & GYNECOLOGY

## 2021-08-04 PROCEDURE — 87635 SARS-COV-2 COVID-19 AMP PRB: CPT | Performed by: OBSTETRICS & GYNECOLOGY

## 2021-08-04 PROCEDURE — 80306 DRUG TEST PRSMV INSTRMNT: CPT | Performed by: OBSTETRICS & GYNECOLOGY

## 2021-08-04 PROCEDURE — 84550 ASSAY OF BLOOD/URIC ACID: CPT | Performed by: OBSTETRICS & GYNECOLOGY

## 2021-08-04 PROCEDURE — 87086 URINE CULTURE/COLONY COUNT: CPT | Performed by: OBSTETRICS & GYNECOLOGY

## 2021-08-04 PROCEDURE — 85384 FIBRINOGEN ACTIVITY: CPT | Performed by: OBSTETRICS & GYNECOLOGY

## 2021-08-04 PROCEDURE — 83615 LACTATE (LD) (LDH) ENZYME: CPT | Performed by: OBSTETRICS & GYNECOLOGY

## 2021-08-04 PROCEDURE — 86901 BLOOD TYPING SEROLOGIC RH(D): CPT | Performed by: OBSTETRICS & GYNECOLOGY

## 2021-08-04 PROCEDURE — 82247 BILIRUBIN TOTAL: CPT | Performed by: OBSTETRICS & GYNECOLOGY

## 2021-08-04 PROCEDURE — 86900 BLOOD TYPING SEROLOGIC ABO: CPT | Performed by: OBSTETRICS & GYNECOLOGY

## 2021-08-04 PROCEDURE — 59025 FETAL NON-STRESS TEST: CPT

## 2021-08-04 RX ORDER — LABETALOL HYDROCHLORIDE 5 MG/ML
20-80 INJECTION, SOLUTION INTRAVENOUS
Status: DISPENSED | OUTPATIENT
Start: 2021-08-04 | End: 2021-08-05

## 2021-08-04 RX ORDER — LIDOCAINE HYDROCHLORIDE 10 MG/ML
5 INJECTION, SOLUTION EPIDURAL; INFILTRATION; INTRACAUDAL; PERINEURAL AS NEEDED
Status: DISCONTINUED | OUTPATIENT
Start: 2021-08-04 | End: 2021-08-10 | Stop reason: HOSPADM

## 2021-08-04 RX ORDER — DEXTROSE AND SODIUM CHLORIDE 5; .2 G/100ML; G/100ML
100 INJECTION, SOLUTION INTRAVENOUS CONTINUOUS
Status: DISCONTINUED | OUTPATIENT
Start: 2021-08-04 | End: 2021-08-10 | Stop reason: HOSPADM

## 2021-08-04 RX ORDER — SODIUM CHLORIDE 0.9 % (FLUSH) 0.9 %
10 SYRINGE (ML) INJECTION EVERY 12 HOURS SCHEDULED
Status: DISCONTINUED | OUTPATIENT
Start: 2021-08-04 | End: 2021-08-10 | Stop reason: HOSPADM

## 2021-08-04 RX ORDER — ACETAMINOPHEN 500 MG
1000 TABLET ORAL EVERY 6 HOURS PRN
Status: DISCONTINUED | OUTPATIENT
Start: 2021-08-04 | End: 2021-08-10 | Stop reason: HOSPADM

## 2021-08-04 RX ORDER — DIPHENHYDRAMINE HCL 25 MG
25 CAPSULE ORAL NIGHTLY PRN
Status: DISCONTINUED | OUTPATIENT
Start: 2021-08-04 | End: 2021-08-10 | Stop reason: HOSPADM

## 2021-08-04 RX ORDER — MAGNESIUM SULFATE HEPTAHYDRATE 40 MG/ML
2 INJECTION, SOLUTION INTRAVENOUS CONTINUOUS
Status: DISCONTINUED | OUTPATIENT
Start: 2021-08-04 | End: 2021-08-06

## 2021-08-04 RX ORDER — ONDANSETRON 2 MG/ML
4 INJECTION INTRAMUSCULAR; INTRAVENOUS EVERY 8 HOURS PRN
Status: DISCONTINUED | OUTPATIENT
Start: 2021-08-04 | End: 2021-08-10 | Stop reason: HOSPADM

## 2021-08-04 RX ORDER — LABETALOL HYDROCHLORIDE 5 MG/ML
INJECTION, SOLUTION INTRAVENOUS
Status: DISCONTINUED
Start: 2021-08-04 | End: 2021-08-10 | Stop reason: HOSPADM

## 2021-08-04 RX ORDER — BETAMETHASONE SODIUM PHOSPHATE AND BETAMETHASONE ACETATE 3; 3 MG/ML; MG/ML
12 INJECTION, SUSPENSION INTRA-ARTICULAR; INTRALESIONAL; INTRAMUSCULAR; SOFT TISSUE ONCE
Status: COMPLETED | OUTPATIENT
Start: 2021-08-05 | End: 2021-08-05

## 2021-08-04 RX ORDER — ONDANSETRON 4 MG/1
4 TABLET, FILM COATED ORAL EVERY 8 HOURS PRN
Status: DISCONTINUED | OUTPATIENT
Start: 2021-08-04 | End: 2021-08-10 | Stop reason: HOSPADM

## 2021-08-04 RX ORDER — CITALOPRAM 20 MG/1
20 TABLET ORAL DAILY
COMMUNITY

## 2021-08-04 RX ORDER — SODIUM CHLORIDE 0.9 % (FLUSH) 0.9 %
1-10 SYRINGE (ML) INJECTION AS NEEDED
Status: DISCONTINUED | OUTPATIENT
Start: 2021-08-04 | End: 2021-08-10 | Stop reason: HOSPADM

## 2021-08-04 RX ORDER — LABETALOL 200 MG/1
200 TABLET, FILM COATED ORAL EVERY 8 HOURS SCHEDULED
Status: DISCONTINUED | OUTPATIENT
Start: 2021-08-04 | End: 2021-08-08

## 2021-08-04 RX ORDER — LABETALOL 200 MG/1
200 TABLET, FILM COATED ORAL ONCE
Status: DISCONTINUED | OUTPATIENT
Start: 2021-08-04 | End: 2021-08-04

## 2021-08-04 RX ORDER — MAGNESIUM SULFATE HEPTAHYDRATE 40 MG/ML
INJECTION, SOLUTION INTRAVENOUS
Status: COMPLETED
Start: 2021-08-04 | End: 2021-08-04

## 2021-08-04 RX ADMIN — MAGNESIUM SULFATE HEPTAHYDRATE 2 G/HR: 40 INJECTION, SOLUTION INTRAVENOUS at 17:05

## 2021-08-04 RX ADMIN — ACETAMINOPHEN 1000 MG: 500 TABLET, FILM COATED ORAL at 20:52

## 2021-08-04 RX ADMIN — DEXTROSE AND SODIUM CHLORIDE 100 ML/HR: 5; 200 INJECTION, SOLUTION INTRAVENOUS at 17:28

## 2021-08-04 RX ADMIN — LABETALOL 20 MG/4 ML (5 MG/ML) INTRAVENOUS SYRINGE 40 MG: at 17:20

## 2021-08-04 RX ADMIN — LABETALOL 20 MG/4 ML (5 MG/ML) INTRAVENOUS SYRINGE 20 MG: at 17:06

## 2021-08-04 RX ADMIN — LABETALOL HYDROCHLORIDE 200 MG: 200 TABLET, FILM COATED ORAL at 17:42

## 2021-08-04 NOTE — H&P
[facility]  Obstetric History and Physical    Referring Provider: Lawrence Ford MD      Chief Complaint   Patient presents with   • Elevated Blood Pressure       Subjective     Patient is a 30 y.o. female  currently at 31w3d, who presents as a transfer from [place] for severe range hypertension, fetus with IUGR.( 10%) and absent end-diastolic flow.   course complicated by chronic hypertension,  delivery x2, prior  x1, and prior pregnancy with preeclampsia.  Patient was seen by provider for  visit and  interval growth scan.  Fetus at the 10th percentile with absent end-diastolic flow. Blood pressure reported 160/100.  Patient was sent to labor and delivery.  Blood pressure remained in the severe range therefore she was started on magnesium sulfate and given her first dose of betamethasone.  Transferred to [facility] due to early gestation and NICU capabilities.  Patient currently denies any complaints.  Patient denies headache, nausea, vomiting, leaking of fluid, vaginal bleeding, anorexia, fever, recent trauma or any others so systems or concerns.  Patient reports normal fetal activity.  Patient daily medications include baby aspirin daily, labetalol 200 mg at breakfast 100 mg at lunch and evening.    Her prenatal care is complicated by  hypertension  chronic hypertension, prior   desires repeat  and abnormal fetal growth  IUGR.  Her previous obstetric/gynecological history is remarkable for .    The following portions of the patients history were reviewed and updated as appropriate: current medications, allergies, past medical history, past surgical history, past family history, past social history and problem list .       Prenatal Information:   Maternal Prenatal Labs  Blood Type No results found for: ABO   Rh Status No results found for: RH   Antibody Screen No results found for: ABSCRN   Gonnorhea No results found for: GCCX    Chlamydia No results found for: CLAMYDCU   RPR No results found for: RPR   Syphilis Antibody No results found for: SYPHILIS   Rubella No results found for: RUBELLAIGGIN   Hepatitis B Surface Antigen No results found for: HEPBSAG   HIV-1 Antibody No results found for: LABHIV1   Hepatitis C Antibody No results found for: HEPCAB   Rapid Urin Drug Screen No results found for: AMPMETHU, BARBITSCNUR, LABBENZSCN, LABMETHSCN, LABOPIASCN, THCURSCR, COCAINEUR, AMPHETSCREEN, PROPOXSCN, BUPRENORSCNU, METAMPSCNUR, OXYCODONESCN, TRICYCLICSCN   Group B Strep Culture No results found for: GBSANTIGEN           External Prenatal Results     Pregnancy Outside Results - Transcribed From Office Records - See Scanned Records For Details     Test Value Date Time    ABO  O  02/21/19 1702    Rh  Positive  02/21/19 1702    Antibody Screen  Negative  02/21/19 1702    Varicella IgG       Rubella       Hgb  12.2 g/dL 02/24/19 0734    Hct  38.4 % 02/24/19 0734    Glucose Fasting GTT       Glucose Tolerance Test 1 hour       Glucose Tolerance Test 3 hour       Gonorrhea (discrete)       Chlamydia (discrete)       RPR       VDRL       Syphilis Antibody       HBsAg       Herpes Simplex Virus PCR       Herpes Simplex VIrus Culture       HIV       Hep C RNA Quant PCR       Hep C Antibody       AFP       Group B Strep       GBS Susceptibility to Clindamycin       GBS Susceptibility to Erythromycin       Fetal Fibronectin       Genetic Testing, Maternal Blood             Drug Screening     Test Value Date Time    Urine Drug Screen       Amphetamine Screen       Barbiturate Screen       Benzodiazepine Screen       Methadone Screen       Phencyclidine Screen       Opiates Screen       THC Screen       Cocaine Screen       Propoxyphene Screen       Buprenorphine Screen       Methamphetamine Screen       Oxycodone Screen       Tricyclic Antidepressants Screen             Legend    ^: Historical                          Past OB History:       OB History     Para Term  AB Living   5 3 1 2 1 3   SAB TAB Ectopic Molar Multiple Live Births   0 0 0 0 0 3      # Outcome Date GA Lbr Timur/2nd Weight Sex Delivery Anes PTL Lv   5 Current            4  19 35w2d  2300 g (5 lb 1.1 oz) M CS-LTranv Spinal N OMER      Name: NIRMAL ALY      Apgar1: 8  Apgar5: 9   3 Term 12/10/14 39w0d  3714 g (8 lb 3 oz) M CS-LVertical Spinal N OMER      Complications: Breech presentation   2  12 36w0d  3033 g (6 lb 11 oz) M Vag-Spont EPI Y OMER   1 AB 11 5w0d    SAB         Obstetric Comments   FOB 1, G1,2   Fob 2, G3,4,5       Past Medical History: Past Medical History:   Diagnosis Date   • Hypertension       Past Surgical History Past Surgical History:   Procedure Laterality Date   •  SECTION     •  SECTION N/A 2019    Procedure:  SECTION REPEAT;  Surgeon: Kaylee Mendoza MD;  Location: [facility] LABOR DELIVERY;  Service: Obstetrics/Gynecology   • LAPAROSCOPIC CHOLECYSTECTOMY     • WISDOM TOOTH EXTRACTION        Family History: No family history on file.   Social History:  reports that she has never smoked. She has never used smokeless tobacco.   reports no history of alcohol use.   reports no history of drug use.                   General ROS Negative Findings:Headaches, Visual Changes, Epigastric pain, Anorexia, Nausia/Vomiting, ROM and Vaginal Bleeding    ROS     All other systems have been reviewed and are neg  Objective       Vital Signs Range for the last 24 hours  Temperature:     Temp Source:     BP:     Pulse:     Respirations:     SPO2:     O2 Amount (l/min):     O2 Devices     Weight: Weight:  [87.5 kg (193 lb)] 87.5 kg (193 lb)     Physical Examination:   General:   alert, appears stated age and cooperative   Skin:   normal   HEENT:  Sclera clear   Lungs:   clear to auscultation bilaterally   Heart:   regular rate and rhythm, S1, S2 normal, no murmur, click, rub or gallop   Gastrointestinal:  Abdomen soft,  gravid uterus, guarding benign exam   Lower Extremities:  No edema, no calf tenderness   : Exam deferred.   Musculoskeletal:   No deformities, full range of motion upper lower extremity   Neuro:  No focal deficit, DTR 2+ 4 no clonus               Fetal Heart Rate Assessment   Method:     Beats/min:     Baseline:     Varibility:     Accels:     Decels:     Tracing Category:       Uterine Assessment   Method:     Frequency (min):     Ctx Count in 10 min:     Duration:     Intensity:     Intensity by IUPC:     Resting Tone:     Resting Tone by IUPC:     [place] Units:       Laboratory Results:   Lab Results (last 24 hours)     ** No results found for the last 24 hours. **        Radiology Review:   Imaging Results (Last 24 Hours)     ** No results found for the last 24 hours. **        Other Studies:    Assessment/Plan       Chronic hypertension with superimposed preeclampsia        Assessment:  1.  Intrauterine pregnancy at 31w3d weeks gestation with reactive fetal status.    2.  Chronic hypertension with superimposed preeclampsia  3.  IUGR 10th percentile with absent end-diastolic flow by ultrasound in [facility]  4.  Prior  x2  5.  History of preeclampsia with prior pregnancy    Plan:  1.  Admit, labs, control blood pressure, continue magnesium sulfate antiseizure prophylaxis, complete steroids for fetal benefit, NICU consult, PDC ultrasound in morning, and  continuous EFM  2. Plan of care has been reviewed with patient.  3.  Risks, benefits of treatment plan have been discussed.  4.  All questions have been answered.  5      Major Castaneda DO  2021  17:12 EDT

## 2021-08-05 ENCOUNTER — APPOINTMENT (OUTPATIENT)
Dept: WOMENS IMAGING | Facility: HOSPITAL | Age: 31
End: 2021-08-05

## 2021-08-05 PROBLEM — O36.5931 IUGR (INTRAUTERINE GROWTH RESTRICTION) AFFECTING CARE OF MOTHER, THIRD TRIMESTER, FETUS 1: Status: ACTIVE | Noted: 2021-08-05

## 2021-08-05 LAB
BACTERIA SPEC AEROBE CULT: NO GROWTH
COLLECT DURATION TIME UR: 24 HRS
CREAT CL 24H UR+SERPL-VRATE: 105.8 ML/MIN (ref 88–128)
CREAT CL 24H UR+SERPL-VRATE: 152.4 L/24 HR (ref 126.7–184.3)
CREAT UR-MCNC: 26.7 MG/DL
CREATINE 24H UR-MRATE: 1.04 G/24 HR (ref 0.7–1.6)
PROT 24H UR-MRATE: 222.3 MG/24HOURS (ref 0–150)
SPECIMEN VOL 24H UR: 3900 ML

## 2021-08-05 PROCEDURE — 76816 OB US FOLLOW-UP PER FETUS: CPT

## 2021-08-05 PROCEDURE — 76819 FETAL BIOPHYS PROFIL W/O NST: CPT

## 2021-08-05 PROCEDURE — 76819 FETAL BIOPHYS PROFIL W/O NST: CPT | Performed by: OBSTETRICS & GYNECOLOGY

## 2021-08-05 PROCEDURE — 84156 ASSAY OF PROTEIN URINE: CPT | Performed by: OBSTETRICS & GYNECOLOGY

## 2021-08-05 PROCEDURE — 25010000003 MAGNESIUM SULFATE 20 GM/500ML SOLUTION: Performed by: OBSTETRICS & GYNECOLOGY

## 2021-08-05 PROCEDURE — 76816 OB US FOLLOW-UP PER FETUS: CPT | Performed by: OBSTETRICS & GYNECOLOGY

## 2021-08-05 PROCEDURE — 59025 FETAL NON-STRESS TEST: CPT

## 2021-08-05 PROCEDURE — 76820 UMBILICAL ARTERY ECHO: CPT | Performed by: OBSTETRICS & GYNECOLOGY

## 2021-08-05 PROCEDURE — 63710000001 DIPHENHYDRAMINE PER 50 MG: Performed by: OBSTETRICS & GYNECOLOGY

## 2021-08-05 PROCEDURE — 76820 UMBILICAL ARTERY ECHO: CPT

## 2021-08-05 PROCEDURE — 25010000002 BETAMETHASONE ACET & SOD PHOS PER 4 MG: Performed by: OBSTETRICS & GYNECOLOGY

## 2021-08-05 PROCEDURE — 82575 CREATININE CLEARANCE TEST: CPT | Performed by: OBSTETRICS & GYNECOLOGY

## 2021-08-05 PROCEDURE — 99253 IP/OBS CNSLTJ NEW/EST LOW 45: CPT | Performed by: OBSTETRICS & GYNECOLOGY

## 2021-08-05 RX ORDER — FAMOTIDINE 20 MG/1
20 TABLET, FILM COATED ORAL ONCE
Status: COMPLETED | OUTPATIENT
Start: 2021-08-05 | End: 2021-08-05

## 2021-08-05 RX ADMIN — FAMOTIDINE 20 MG: 20 TABLET, FILM COATED ORAL at 02:01

## 2021-08-05 RX ADMIN — FAMOTIDINE 20 MG: 20 TABLET, FILM COATED ORAL at 22:42

## 2021-08-05 RX ADMIN — BETAMETHASONE SODIUM PHOSPHATE AND BETAMETHASONE ACETATE 12 MG: 3; 3 INJECTION, SUSPENSION INTRA-ARTICULAR; INTRALESIONAL; INTRAMUSCULAR at 12:52

## 2021-08-05 RX ADMIN — ACETAMINOPHEN 1000 MG: 500 TABLET, FILM COATED ORAL at 13:17

## 2021-08-05 RX ADMIN — LABETALOL HYDROCHLORIDE 200 MG: 200 TABLET, FILM COATED ORAL at 06:03

## 2021-08-05 RX ADMIN — ACETAMINOPHEN 1000 MG: 500 TABLET, FILM COATED ORAL at 06:28

## 2021-08-05 RX ADMIN — LABETALOL HYDROCHLORIDE 200 MG: 200 TABLET, FILM COATED ORAL at 21:55

## 2021-08-05 RX ADMIN — LABETALOL HYDROCHLORIDE 200 MG: 200 TABLET, FILM COATED ORAL at 13:53

## 2021-08-05 RX ADMIN — DIPHENHYDRAMINE HYDROCHLORIDE 25 MG: 25 CAPSULE ORAL at 02:08

## 2021-08-05 RX ADMIN — DEXTROSE AND SODIUM CHLORIDE 100 ML/HR: 5; 200 INJECTION, SOLUTION INTRAVENOUS at 18:15

## 2021-08-05 RX ADMIN — MAGNESIUM SULFATE HEPTAHYDRATE 2 G/HR: 40 INJECTION, SOLUTION INTRAVENOUS at 14:29

## 2021-08-05 RX ADMIN — MAGNESIUM SULFATE HEPTAHYDRATE 2 G/HR: 40 INJECTION, SOLUTION INTRAVENOUS at 02:04

## 2021-08-05 NOTE — CONSULTS
Maternal Fetal Medicine Consult    Patient Name:  Gregoria Miguel  :  1990  MRN:  [id number]  Date of Service:  2021  Referring Provider: Lawrence Ford     Treatment Team:   Attending Provider: Major Castaneda DO  Admitting Provider: Major Castaneda DO      ID:   30 y.o.  at 31w4d    Consultation due to:  Chronic hypertension with superimposed preeclampsia    Pregnancy course significant for:   Patient Active Problem List   Diagnosis   • Pregnancy   • Chronic hypertension in pregnancy   • Previous  section   • Echogenic focus of heart of fetus affecting antepartum care of mother   • Chronic hypertension with superimposed preeclampsia   • IUGR (intrauterine growth restriction) affecting care of mother, third trimester, fetus 1     Chief Complaint   Patient presents with   • Elevated Blood Pressure       History of Present Illness:        30 year old  at 31 weeks 4 days with CHTN now with likely superimposed preeclampsia with severe features. Patient with preeclampsia at 35 weeks in her prior pregnancy.   Had been well controlled on Labetalol 200 mg TID.   Was seen for routine ultrasound and noted to have severe range BP at that time. Fetus noted to have FGR with AEDF.   Was asymptomatic   Transferred to [facility] where stabilized, started on Mag Sulfate and given first dose of betamethasone.   Currently feels well without headache, RUQ pain, vision changes, LOF, CTX, N/V, SOA.     Prenatal Labs   Lab Results   Component Value Date    HGB 13.7 2021    HEPBSAG Negative 2021    ABO O 2021    RH Positive 2021    ABSCRN Negative 2021    TEV1AKM8 Non-Reactive 2021    HEPCVIRUSABY Negative 2021    URINECX Culture in progress 2021       REVIEW OF SYSTEMS   All other systems reviewed and negative     OB HISTORY    OB History    Para Term  AB Living   5 3 1 2 1 3   SAB TAB Ectopic Molar Multiple Live Births   0 0 0 0 0 3       # Outcome Date GA Lbr Timur/2nd Weight Sex Delivery Anes PTL Lv   5 Current            4  19 35w2d  2300 g (5 lb 1.1 oz) M CS-LTranv Spinal N OMER      Birth Comments: pre-eclampsia   3 Term 12/10/14 39w0d  3714 g (8 lb 3 oz) M CS-LTranv Spinal N OMER      Complications: Breech presentation   2  12 36w0d  3033 g (6 lb 11 oz) M Vag-Spont EPI Y OMER   1 AB 11 5w0d    SAB         Obstetric Comments   FOB 1, G1,2   Fob 2, G3,4,5       PAST MEDICAL HISTORY    Past Medical History:   Diagnosis Date   • Hypertension        PAST SURGICAL HISTORY     has a past surgical history that includes Laparoscopic cholecystectomy; [place] tooth extraction;  section;  section (N/A, 2019); Tonsillectomy; and Adenoidectomy.    CURRENT MEDICATIONS      Current Facility-Administered Medications:   •  acetaminophen (TYLENOL) tablet 1,000 mg, 1,000 mg, Oral, Q6H PRN, Esvin Campbell MD, 1,000 mg at 21 0628  •  betamethasone acetate-betamethasone sodium phosphate (CELESTONE SOLUSPAN) injection 12 mg, 12 mg, Intramuscular, Once, Major Castaneda DO  •  dextrose 5 % and sodium chloride 0.2 % infusion, 100 mL/hr, Intravenous, Continuous, Major Castaneda DO, Last Rate: 100 mL/hr at 21 1728, 100 mL/hr at 21 1728  •  diphenhydrAMINE (BENADRYL) capsule 25 mg, 25 mg, Oral, Nightly PRN, Esvin Campbell MD, 25 mg at 21 0208  •  labetalol (NORMODYNE) tablet 200 mg, 200 mg, Oral, Q8H, Major Castaneda DO, 200 mg at 21 0603  •  labetalol (NORMODYNE,TRANDATE) 5 MG/ML injection  - ADS Override Pull, , , ,   •  labetalol (NORMODYNE,TRANDATE) injection 20-80 mg, 20-80 mg, Intravenous, Q10 Min PRN, Major Castaneda DO, 40 mg at 21 1720  •  lidocaine PF 1% (XYLOCAINE) injection 5 mL, 5 mL, Intradermal, PRN, Major Castaneda DO  •  magnesium sulfate 20 GM/500ML infusion, 2 g/hr, Intravenous, Continuous, Major Castaneda DO, Last Rate: 50 mL/hr at 21  1149, 2 g/hr at 08/05/21 1149  •  ondansetron (ZOFRAN) tablet 4 mg, 4 mg, Oral, Q8H PRN **OR** ondansetron (ZOFRAN) injection 4 mg, 4 mg, Intravenous, Q8H PRN, Major Castaneda, DO  •  sodium chloride 0.9 % flush 1-10 mL, 1-10 mL, Intravenous, PRN, Major Castaneda, DO  •  sodium chloride 0.9 % flush 10 mL, 10 mL, Intravenous, Q12H, Major Castaneda, DO    ALLERGIES    Penicillins    SOCIAL HISTORY    Social History     Tobacco Use   Smoking Status Never Smoker   Smokeless Tobacco Never Used      Social History     Substance and Sexual Activity   Alcohol Use No     Social History     Substance and Sexual Activity   Drug Use No       FAMILY HISTORY  N/C     PHYSICAL EXAMINATION    Vital Signs Range for the last 24 hours  Temperature: Temp:  [97.8 °F (36.6 °C)-98.7 °F (37.1 °C)] 97.9 °F (36.6 °C)   Temp Source: Temp src: Oral   BP: BP: (108-182)/() 120/75   Pulse: Heart Rate:  [76-90] 87   Respirations: Resp:  [16-18] 16   Weight: 87.5 kg (193 lb)       NAD   Alert, Oriented x 3   Normal pulmonary effort   Abdomen gravid, nontender   1+ bilateral lower extremity edema     Non-Stress Test:    Fetal Heart Rate Assessment     , +10x10 accels, occasional late appearing decels, periods of moderate BTBV with periods of minimal BTBV           Uterine Assessment      [employer] [facility]: None         Ultrasound:  See Viewpoint    FGR noted (overall 11th%ile, AC < 1st%ile), IAEDF noted         Labs:  Labs:  WBC   Date Value Ref Range Status   08/04/2021 7.39 3.40 - 10.80 10*3/mm3 Final     RBC   Date Value Ref Range Status   08/04/2021 4.51 3.77 - 5.28 10*6/mm3 Final     Hemoglobin   Date Value Ref Range Status   08/04/2021 13.7 12.0 - 15.9 g/dL Final     Hematocrit   Date Value Ref Range Status   08/04/2021 39.8 34.0 - 46.6 % Final     MCV   Date Value Ref Range Status   08/04/2021 88.2 79.0 - 97.0 fL Final     MCH   Date Value Ref Range Status   08/04/2021 30.4 26.6 - 33.0 pg Final     MCHC   Date Value Ref Range Status    2021 34.4 31.5 - 35.7 g/dL Final     RDW   Date Value Ref Range Status   2021 12.7 12.3 - 15.4 % Final     RDW-SD   Date Value Ref Range Status   2021 40.6 37.0 - 54.0 fl Final     MPV   Date Value Ref Range Status   2021 11.0 6.0 - 12.0 fL Final     Platelets   Date Value Ref Range Status   2021 239 140 - 450 10*3/mm3 Final       Results from last 7 days   Lab Units 21  1722   ABO TYPING  O   RH TYPING  Positive   ANTIBODY SCREEN  Negative       Preeclampsia Panel Lab Result  Lab Results   Component Value Date    ALKPHOS 148 (H) 2021    ALT 15 2021    AST 23 2021    CREATININE 0.60 2021    CREATININE 0.60 2021    BILITOT 0.4 2021     2021    URICACID 6.8 (H) 2021       ASSESSMENT/PLAN:  30 y.o. female  at 31w4d with Chronic hypertension with superimposed preeclampsia with severe features   Fetus noted to have FGR with IAEDF - likely secondary to uteroplacental insufficiency   Patient currently asymptomatic with well controlled BP on current Labetalol dose and normal labs   Plan CEFM at this time for continued evaluation of FWB   Would move to deliver if signs or symptoms of worsening disease: including but not limited to - maternal symptoms (headache, RUQ pain, SOA, vision changes), non-reassuring fetal testing, oliguria, worsening labs (AST/ALT twice normal, Cr > 1.0, Platelets <100), max doses of two oral antihypertensives, pulmonary edema, eclampsia OR 34 weeks gestation.     Approximately 45 minutes floor time was spent in care of this patient, of which greater than 50% was spent in face-to-face consultation and coordination of care.    Viviane Rivera MD     2021   12:34 EDT

## 2021-08-06 ENCOUNTER — ANESTHESIA (OUTPATIENT)
Dept: LABOR AND DELIVERY | Facility: HOSPITAL | Age: 31
End: 2021-08-06

## 2021-08-06 ENCOUNTER — APPOINTMENT (OUTPATIENT)
Dept: WOMENS IMAGING | Facility: HOSPITAL | Age: 31
End: 2021-08-06

## 2021-08-06 ENCOUNTER — ANESTHESIA EVENT (OUTPATIENT)
Dept: LABOR AND DELIVERY | Facility: HOSPITAL | Age: 31
End: 2021-08-06

## 2021-08-06 LAB
ALP SERPL-CCNC: 134 U/L (ref 39–117)
ALT SERPL W P-5'-P-CCNC: 19 U/L (ref 1–33)
AST SERPL-CCNC: 22 U/L (ref 1–32)
BILIRUB SERPL-MCNC: 0.2 MG/DL (ref 0–1.2)
CREAT SERPL-MCNC: 0.69 MG/DL (ref 0.57–1)
DEPRECATED RDW RBC AUTO: 43.9 FL (ref 37–54)
ERYTHROCYTE [DISTWIDTH] IN BLOOD BY AUTOMATED COUNT: 13 % (ref 12.3–15.4)
HCT VFR BLD AUTO: 37.7 % (ref 34–46.6)
HGB BLD-MCNC: 12.4 G/DL (ref 12–15.9)
LDH SERPL-CCNC: 263 U/L (ref 135–214)
MCH RBC QN AUTO: 30.5 PG (ref 26.6–33)
MCHC RBC AUTO-ENTMCNC: 32.9 G/DL (ref 31.5–35.7)
MCV RBC AUTO: 92.9 FL (ref 79–97)
PLATELET # BLD AUTO: 224 10*3/MM3 (ref 140–450)
PMV BLD AUTO: 10.8 FL (ref 6–12)
RBC # BLD AUTO: 4.06 10*6/MM3 (ref 3.77–5.28)
URATE SERPL-MCNC: 6.3 MG/DL (ref 2.4–5.7)
WBC # BLD AUTO: 8.75 10*3/MM3 (ref 3.4–10.8)

## 2021-08-06 PROCEDURE — 76819 FETAL BIOPHYS PROFIL W/O NST: CPT | Performed by: OBSTETRICS & GYNECOLOGY

## 2021-08-06 PROCEDURE — 82247 BILIRUBIN TOTAL: CPT | Performed by: OBSTETRICS & GYNECOLOGY

## 2021-08-06 PROCEDURE — 59025 FETAL NON-STRESS TEST: CPT

## 2021-08-06 PROCEDURE — 25010000003 MAGNESIUM SULFATE 20 GM/500ML SOLUTION: Performed by: OBSTETRICS & GYNECOLOGY

## 2021-08-06 PROCEDURE — 85027 COMPLETE CBC AUTOMATED: CPT | Performed by: OBSTETRICS & GYNECOLOGY

## 2021-08-06 PROCEDURE — 83615 LACTATE (LD) (LDH) ENZYME: CPT | Performed by: OBSTETRICS & GYNECOLOGY

## 2021-08-06 PROCEDURE — 99232 SBSQ HOSP IP/OBS MODERATE 35: CPT | Performed by: OBSTETRICS & GYNECOLOGY

## 2021-08-06 PROCEDURE — 84550 ASSAY OF BLOOD/URIC ACID: CPT | Performed by: OBSTETRICS & GYNECOLOGY

## 2021-08-06 PROCEDURE — 82565 ASSAY OF CREATININE: CPT | Performed by: OBSTETRICS & GYNECOLOGY

## 2021-08-06 PROCEDURE — 84460 ALANINE AMINO (ALT) (SGPT): CPT | Performed by: OBSTETRICS & GYNECOLOGY

## 2021-08-06 PROCEDURE — 76820 UMBILICAL ARTERY ECHO: CPT

## 2021-08-06 PROCEDURE — 76819 FETAL BIOPHYS PROFIL W/O NST: CPT

## 2021-08-06 PROCEDURE — 25010000002 CEFAZOLIN PER 500 MG

## 2021-08-06 PROCEDURE — 84450 TRANSFERASE (AST) (SGOT): CPT | Performed by: OBSTETRICS & GYNECOLOGY

## 2021-08-06 PROCEDURE — 84075 ASSAY ALKALINE PHOSPHATASE: CPT | Performed by: OBSTETRICS & GYNECOLOGY

## 2021-08-06 PROCEDURE — 76820 UMBILICAL ARTERY ECHO: CPT | Performed by: OBSTETRICS & GYNECOLOGY

## 2021-08-06 RX ORDER — DOCUSATE SODIUM 100 MG/1
100 CAPSULE, LIQUID FILLED ORAL 2 TIMES DAILY
Status: DISCONTINUED | OUTPATIENT
Start: 2021-08-06 | End: 2021-08-10 | Stop reason: HOSPADM

## 2021-08-06 RX ORDER — MAGNESIUM SULFATE HEPTAHYDRATE 40 MG/ML
INJECTION, SOLUTION INTRAVENOUS
Status: ACTIVE
Start: 2021-08-06 | End: 2021-08-07

## 2021-08-06 RX ORDER — MAGNESIUM SULFATE HEPTAHYDRATE 40 MG/ML
1 INJECTION, SOLUTION INTRAVENOUS CONTINUOUS
Status: ACTIVE | OUTPATIENT
Start: 2021-08-07 | End: 2021-08-07

## 2021-08-06 RX ORDER — TRISODIUM CITRATE DIHYDRATE AND CITRIC ACID MONOHYDRATE 500; 334 MG/5ML; MG/5ML
SOLUTION ORAL
Status: ACTIVE
Start: 2021-08-06 | End: 2021-08-07

## 2021-08-06 RX ORDER — CEFAZOLIN SODIUM 2 G/100ML
INJECTION, SOLUTION INTRAVENOUS
Status: COMPLETED
Start: 2021-08-06 | End: 2021-08-06

## 2021-08-06 RX ADMIN — CEFAZOLIN 2 G: 10 INJECTION, POWDER, FOR SOLUTION INTRAVENOUS at 23:49

## 2021-08-06 RX ADMIN — MAGNESIUM SULFATE HEPTAHYDRATE 2 G/HR: 40 INJECTION, SOLUTION INTRAVENOUS at 06:53

## 2021-08-06 RX ADMIN — MAGNESIUM SULFATE HEPTAHYDRATE 1 G/HR: 40 INJECTION, SOLUTION INTRAVENOUS at 23:35

## 2021-08-06 RX ADMIN — DOCUSATE SODIUM 100 MG: 100 CAPSULE, LIQUID FILLED ORAL at 17:18

## 2021-08-06 RX ADMIN — ACETAMINOPHEN 1000 MG: 500 TABLET, FILM COATED ORAL at 06:53

## 2021-08-06 RX ADMIN — LABETALOL HYDROCHLORIDE 200 MG: 200 TABLET, FILM COATED ORAL at 21:33

## 2021-08-06 RX ADMIN — LABETALOL HYDROCHLORIDE 200 MG: 200 TABLET, FILM COATED ORAL at 06:19

## 2021-08-06 RX ADMIN — LABETALOL HYDROCHLORIDE 200 MG: 200 TABLET, FILM COATED ORAL at 13:30

## 2021-08-06 NOTE — PROGRESS NOTES
"[facility]  Maternal-Fetal Medicine  Progress Note    HD#3       Chief Complaint:  Chief Complaint   Patient presents with   • Elevated Blood Pressure       30 year old  at 31 weeks 5 days gestation admitted with CHTN, concern for superimposed preeclampsia, FGR with AEDF. Has received betamethasone. Normal 24 hour urine. Blood pressures well controlled on current regimen. Magnsium sulfate off this AM       Subjective     Patient feels well. No HA, vision changes, RUQ pain.     Objective     Vital Signs Range for the last 24 hours  Temp:  [97.6 °F (36.4 °C)-98.3 °F (36.8 °C)] 97.6 °F (36.4 °C)   Temp src: Oral   BP: (120-153)/(73-91) 146/90   Heart Rate:  [73-90] 82   Resp:  [16-18] 16   SpO2:  [96 %-99 %] 98 %       Device (Oxygen Therapy): room air           Flowsheet Rows      First Filed Value   Admission Height  167.6 cm (66\") Documented at 2021 1711   Admission Weight  87.5 kg (193 lb) Documented at 2021 1711          Intake/Output last 24 hours:      Intake/Output Summary (Last 24 hours) at 2021 1041  Last data filed at 2021 0733  Gross per 24 hour   Intake 1355 ml   Output 4050 ml   Net -2695 ml       Intake/Output this shift:    No intake/output data recorded.    Physical Exam:      NAD   Normal pulmonary effort   Abdomen gravid, nontender   Trace edema     Fetal Heart Rate Assessment     , +accels, occasional late appearing decel, mod BTBV     Uterine Assessment           Ctx Count in 10 min:     Duration:     Intensity: Contraction Intensity: no contractions   Intensity by IUPC:     Resting Tone: Uterine Resting Tone: soft by palpation   Resting Tone by IUPC:     [place] Units:       Ultrasound   See Viewpoint   Normal fluid  BPP 8/8   UA doppler IAEDF       Medications:  labetalol, 200 mg, Oral, Q8H  labetalol, , ,   sodium chloride, 10 mL, Intravenous, Q12H       •  acetaminophen  •  diphenhydrAMINE  •  lidocaine PF 1%  •  ondansetron **OR** ondansetron  •  sodium " chloride    Labs:  Lab Results   Component Value Date    HGB 13.7 2021     CBC:   Lab Results   Component Value Date     2021    HGB 13.7 2021    HCT 39.8 2021    WBC 7.39 2021     Pre-eclampsia Panel:   Lab Results   Component Value Date    ALKPHOS 148 (H) 2021    AST 23 2021    ALT 15 2021    BILITOT 0.4 2021     2021    URICACID 6.8 (H) 2021    CREATININE 0.60 2021    CREATININE 0.60 2021     24 hour urine results:   Lab Results   Component Value Date    URINEVOLUME 3,900 2021    WJVGIDS10GP 222.3 (H) 2021    CRCLEARANCE 105.8 2021         Assessment/Plan       Chronic hypertension with superimposed preeclampsia    Chronic hypertension in pregnancy    Previous  section    IUGR (intrauterine growth restriction) affecting care of mother, third trimester, fetus 1      30 year old  at 31 weeks 5 days gestation admitted with CHTN, FGR with AEDF   Overall reassuring fetal status today   Has ruled out for preeclampsia at this time with normal 24 hour urine, normal PIH labs   Blood pressures currently well controlled   Will plan continued inpatient management with TID NSTs, CEFM as needed   Repeat UA dopplers on 21   Discussed with patient that if UA dopplers remain absent but fetal testing otherwise remains reassuring, delivery at 33 - 34 weeks is appropriate     Viviane Rivera MD  2021  10:41 EDT

## 2021-08-07 LAB
ATMOSPHERIC PRESS: ABNORMAL MM[HG]
ATMOSPHERIC PRESS: ABNORMAL MM[HG]
BASE EXCESS BLDCOA CALC-SCNC: 0.7 MMOL/L (ref 0–2)
BASE EXCESS BLDCOV CALC-SCNC: 0.4 MMOL/L (ref 0–2)
BDY SITE: ABNORMAL
BDY SITE: ABNORMAL
BODY TEMPERATURE: 37 C
BODY TEMPERATURE: 37 C
CO2 BLDA-SCNC: 29.7 MMOL/L (ref 22–33)
CO2 BLDA-SCNC: 31.4 MMOL/L (ref 22–33)
COLLECT TME SMN: ABNORMAL
EPAP: 0
EPAP: 0
HCO3 BLDCOA-SCNC: 29.4 MMOL/L (ref 16.9–20.5)
HCO3 BLDCOV-SCNC: 28 MMOL/L (ref 18.6–21.4)
HGB BLDA-MCNC: 16.9 G/DL (ref 14–18)
HGB BLDA-MCNC: 17 G/DL (ref 14–18)
INHALED O2 CONCENTRATION: 21 %
INHALED O2 CONCENTRATION: 21 %
IPAP: 0
IPAP: 0
MODALITY: ABNORMAL
MODALITY: ABNORMAL
NOTE: ABNORMAL
NOTE: ABNORMAL
PAW @ PEAK INSP FLOW SETTING VENT: 0 CMH2O
PAW @ PEAK INSP FLOW SETTING VENT: 0 CMH2O
PCO2 BLDCOA: 62.5 MMHG (ref 43.3–54.9)
PCO2 BLDCOV: 54.7 MM HG (ref 28–40)
PH BLDCOA: 7.28 PH UNITS (ref 7.22–7.3)
PH BLDCOV: 7.32 PH UNITS (ref 7.31–7.37)
PO2 BLDCOA: ABNORMAL MM[HG]
PO2 BLDCOV: 14.1 MM HG (ref 21–31)
SAO2 % BLDCOA: 9.3 %
SAO2 % BLDCOA: ABNORMAL %
SAO2 % BLDCOV: 23.6 %
TOTAL RATE: 0 BREATHS/MINUTE
TOTAL RATE: 0 BREATHS/MINUTE

## 2021-08-07 PROCEDURE — C1889 IMPLANT/INSERT DEVICE, NOC: HCPCS | Performed by: OBSTETRICS & GYNECOLOGY

## 2021-08-07 PROCEDURE — 59515 CESAREAN DELIVERY: CPT | Performed by: OBSTETRICS & GYNECOLOGY

## 2021-08-07 PROCEDURE — 25010000002 KETOROLAC TROMETHAMINE PER 15 MG: Performed by: OBSTETRICS & GYNECOLOGY

## 2021-08-07 PROCEDURE — 88307 TISSUE EXAM BY PATHOLOGIST: CPT | Performed by: OBSTETRICS & GYNECOLOGY

## 2021-08-07 PROCEDURE — 25010000002 FENTANYL CITRATE (PF) 50 MCG/ML SOLUTION: Performed by: ANESTHESIOLOGY

## 2021-08-07 PROCEDURE — 25010000002 METOCLOPRAMIDE PER 10 MG: Performed by: ANESTHESIOLOGY

## 2021-08-07 PROCEDURE — 82805 BLOOD GASES W/O2 SATURATION: CPT

## 2021-08-07 PROCEDURE — 25010000003 MORPHINE PER 10 MG: Performed by: ANESTHESIOLOGY

## 2021-08-07 PROCEDURE — 25010000003 MAGNESIUM SULFATE 20 GM/500ML SOLUTION: Performed by: OBSTETRICS & GYNECOLOGY

## 2021-08-07 PROCEDURE — 63710000001 DIPHENHYDRAMINE PER 50 MG: Performed by: OBSTETRICS & GYNECOLOGY

## 2021-08-07 PROCEDURE — 25010000002 ONDANSETRON PER 1 MG: Performed by: ANESTHESIOLOGY

## 2021-08-07 PROCEDURE — 25010000002 DIPHENHYDRAMINE PER 50 MG: Performed by: ANESTHESIOLOGY

## 2021-08-07 DEVICE — HEMOST ABS SURGICEL PWDR 3GM: Type: IMPLANTABLE DEVICE | Site: ABDOMEN | Status: FUNCTIONAL

## 2021-08-07 RX ORDER — ACETAMINOPHEN 500 MG
1000 TABLET ORAL ONCE
Status: DISCONTINUED | OUTPATIENT
Start: 2021-08-07 | End: 2021-08-07 | Stop reason: HOSPADM

## 2021-08-07 RX ORDER — CARBOPROST TROMETHAMINE 250 UG/ML
250 INJECTION, SOLUTION INTRAMUSCULAR
Status: DISCONTINUED | OUTPATIENT
Start: 2021-08-07 | End: 2021-08-07 | Stop reason: HOSPADM

## 2021-08-07 RX ORDER — KETOROLAC TROMETHAMINE 15 MG/ML
15 INJECTION, SOLUTION INTRAMUSCULAR; INTRAVENOUS EVERY 6 HOURS
Status: COMPLETED | OUTPATIENT
Start: 2021-08-07 | End: 2021-08-08

## 2021-08-07 RX ORDER — PRENATAL VIT/IRON FUM/FOLIC AC 27MG-0.8MG
1 TABLET ORAL DAILY
Status: DISCONTINUED | OUTPATIENT
Start: 2021-08-07 | End: 2021-08-10 | Stop reason: HOSPADM

## 2021-08-07 RX ORDER — SODIUM CHLORIDE 0.9 % (FLUSH) 0.9 %
3-10 SYRINGE (ML) INJECTION AS NEEDED
Status: DISCONTINUED | OUTPATIENT
Start: 2021-08-07 | End: 2021-08-10 | Stop reason: HOSPADM

## 2021-08-07 RX ORDER — ONDANSETRON 2 MG/ML
INJECTION INTRAMUSCULAR; INTRAVENOUS AS NEEDED
Status: DISCONTINUED | OUTPATIENT
Start: 2021-08-07 | End: 2021-08-07 | Stop reason: SURG

## 2021-08-07 RX ORDER — KETOROLAC TROMETHAMINE 30 MG/ML
30 INJECTION, SOLUTION INTRAMUSCULAR; INTRAVENOUS ONCE
Status: COMPLETED | OUTPATIENT
Start: 2021-08-07 | End: 2021-08-07

## 2021-08-07 RX ORDER — FAMOTIDINE 10 MG/ML
INJECTION, SOLUTION INTRAVENOUS AS NEEDED
Status: DISCONTINUED | OUTPATIENT
Start: 2021-08-07 | End: 2021-08-07 | Stop reason: SURG

## 2021-08-07 RX ORDER — MORPHINE SULFATE 2 MG/ML
2 INJECTION, SOLUTION INTRAMUSCULAR; INTRAVENOUS EVERY 4 HOURS PRN
Status: DISCONTINUED | OUTPATIENT
Start: 2021-08-07 | End: 2021-08-10 | Stop reason: HOSPADM

## 2021-08-07 RX ORDER — KETOROLAC TROMETHAMINE 15 MG/ML
15 INJECTION, SOLUTION INTRAMUSCULAR; INTRAVENOUS EVERY 6 HOURS
Status: DISCONTINUED | OUTPATIENT
Start: 2021-08-07 | End: 2021-08-07

## 2021-08-07 RX ORDER — DIPHENHYDRAMINE HYDROCHLORIDE 50 MG/ML
25 INJECTION INTRAMUSCULAR; INTRAVENOUS EVERY 6 HOURS PRN
Status: DISCONTINUED | OUTPATIENT
Start: 2021-08-07 | End: 2021-08-10 | Stop reason: HOSPADM

## 2021-08-07 RX ORDER — MISOPROSTOL 200 UG/1
800 TABLET ORAL ONCE AS NEEDED
Status: DISCONTINUED | OUTPATIENT
Start: 2021-08-07 | End: 2021-08-07 | Stop reason: HOSPADM

## 2021-08-07 RX ORDER — ACETAMINOPHEN 325 MG/1
650 TABLET ORAL EVERY 6 HOURS
Status: DISCONTINUED | OUTPATIENT
Start: 2021-08-08 | End: 2021-08-10 | Stop reason: HOSPADM

## 2021-08-07 RX ORDER — OXYTOCIN-SODIUM CHLORIDE 0.9% IV SOLN 30 UNIT/500ML 30-0.9/5 UT/ML-%
SOLUTION INTRAVENOUS AS NEEDED
Status: DISCONTINUED | OUTPATIENT
Start: 2021-08-07 | End: 2021-08-07 | Stop reason: SURG

## 2021-08-07 RX ORDER — SODIUM CHLORIDE 0.9 % (FLUSH) 0.9 %
10 SYRINGE (ML) INJECTION AS NEEDED
Status: DISCONTINUED | OUTPATIENT
Start: 2021-08-07 | End: 2021-08-07 | Stop reason: HOSPADM

## 2021-08-07 RX ORDER — SODIUM CHLORIDE 0.9 % (FLUSH) 0.9 %
3 SYRINGE (ML) INJECTION EVERY 12 HOURS SCHEDULED
Status: DISCONTINUED | OUTPATIENT
Start: 2021-08-07 | End: 2021-08-10 | Stop reason: HOSPADM

## 2021-08-07 RX ORDER — PROMETHAZINE HYDROCHLORIDE 25 MG/1
25 TABLET ORAL EVERY 6 HOURS PRN
Status: DISCONTINUED | OUTPATIENT
Start: 2021-08-07 | End: 2021-08-10 | Stop reason: HOSPADM

## 2021-08-07 RX ORDER — MORPHINE SULFATE 0.5 MG/ML
INJECTION, SOLUTION EPIDURAL; INTRATHECAL; INTRAVENOUS
Status: COMPLETED | OUTPATIENT
Start: 2021-08-07 | End: 2021-08-07

## 2021-08-07 RX ORDER — CALCIUM CARBONATE 200(500)MG
1 TABLET,CHEWABLE ORAL EVERY 4 HOURS PRN
Status: DISCONTINUED | OUTPATIENT
Start: 2021-08-07 | End: 2021-08-10 | Stop reason: HOSPADM

## 2021-08-07 RX ORDER — METOCLOPRAMIDE HYDROCHLORIDE 5 MG/ML
INJECTION INTRAMUSCULAR; INTRAVENOUS AS NEEDED
Status: DISCONTINUED | OUTPATIENT
Start: 2021-08-07 | End: 2021-08-07

## 2021-08-07 RX ORDER — SODIUM CHLORIDE 0.9 % (FLUSH) 0.9 %
10 SYRINGE (ML) INJECTION EVERY 12 HOURS SCHEDULED
Status: DISCONTINUED | OUTPATIENT
Start: 2021-08-07 | End: 2021-08-07 | Stop reason: HOSPADM

## 2021-08-07 RX ORDER — ACETAMINOPHEN 500 MG
1000 TABLET ORAL EVERY 6 HOURS
Status: DISCONTINUED | OUTPATIENT
Start: 2021-08-07 | End: 2021-08-07

## 2021-08-07 RX ORDER — OXYTOCIN-SODIUM CHLORIDE 0.9% IV SOLN 30 UNIT/500ML 30-0.9/5 UT/ML-%
85 SOLUTION INTRAVENOUS ONCE
Status: DISCONTINUED | OUTPATIENT
Start: 2021-08-07 | End: 2021-08-07 | Stop reason: HOSPADM

## 2021-08-07 RX ORDER — SIMETHICONE 80 MG
80 TABLET,CHEWABLE ORAL 4 TIMES DAILY PRN
Status: DISCONTINUED | OUTPATIENT
Start: 2021-08-07 | End: 2021-08-10 | Stop reason: HOSPADM

## 2021-08-07 RX ORDER — LANOLIN
CREAM (ML) TOPICAL
Status: DISCONTINUED | OUTPATIENT
Start: 2021-08-07 | End: 2021-08-10 | Stop reason: HOSPADM

## 2021-08-07 RX ORDER — CEFAZOLIN SODIUM 2 G/100ML
2 INJECTION, SOLUTION INTRAVENOUS ONCE
Status: DISCONTINUED | OUTPATIENT
Start: 2021-08-07 | End: 2021-08-07 | Stop reason: HOSPADM

## 2021-08-07 RX ORDER — ACETAMINOPHEN 500 MG
1000 TABLET ORAL EVERY 6 HOURS
Status: COMPLETED | OUTPATIENT
Start: 2021-08-07 | End: 2021-08-08

## 2021-08-07 RX ORDER — OXYTOCIN-SODIUM CHLORIDE 0.9% IV SOLN 30 UNIT/500ML 30-0.9/5 UT/ML-%
650 SOLUTION INTRAVENOUS ONCE
Status: DISCONTINUED | OUTPATIENT
Start: 2021-08-07 | End: 2021-08-07 | Stop reason: HOSPADM

## 2021-08-07 RX ORDER — ALUMINA, MAGNESIA, AND SIMETHICONE 2400; 2400; 240 MG/30ML; MG/30ML; MG/30ML
15 SUSPENSION ORAL EVERY 4 HOURS PRN
Status: DISCONTINUED | OUTPATIENT
Start: 2021-08-07 | End: 2021-08-10 | Stop reason: HOSPADM

## 2021-08-07 RX ORDER — PROMETHAZINE HYDROCHLORIDE 12.5 MG/1
12.5 SUPPOSITORY RECTAL EVERY 6 HOURS PRN
Status: DISCONTINUED | OUTPATIENT
Start: 2021-08-07 | End: 2021-08-10 | Stop reason: HOSPADM

## 2021-08-07 RX ORDER — DIPHENHYDRAMINE HYDROCHLORIDE 50 MG/ML
12.5 INJECTION INTRAMUSCULAR; INTRAVENOUS ONCE
Status: COMPLETED | OUTPATIENT
Start: 2021-08-07 | End: 2021-08-07

## 2021-08-07 RX ORDER — BUPIVACAINE HYDROCHLORIDE 7.5 MG/ML
INJECTION, SOLUTION INTRASPINAL
Status: COMPLETED | OUTPATIENT
Start: 2021-08-07 | End: 2021-08-07

## 2021-08-07 RX ORDER — METHYLERGONOVINE MALEATE 0.2 MG/ML
200 INJECTION INTRAVENOUS ONCE AS NEEDED
Status: DISCONTINUED | OUTPATIENT
Start: 2021-08-07 | End: 2021-08-07 | Stop reason: HOSPADM

## 2021-08-07 RX ORDER — TRISODIUM CITRATE DIHYDRATE AND CITRIC ACID MONOHYDRATE 500; 334 MG/5ML; MG/5ML
30 SOLUTION ORAL ONCE
Status: COMPLETED | OUTPATIENT
Start: 2021-08-07 | End: 2021-08-07

## 2021-08-07 RX ORDER — HYDROCORTISONE 25 MG/G
1 CREAM TOPICAL AS NEEDED
Status: DISCONTINUED | OUTPATIENT
Start: 2021-08-07 | End: 2021-08-10 | Stop reason: HOSPADM

## 2021-08-07 RX ORDER — NALOXONE HCL 0.4 MG/ML
0.4 VIAL (ML) INJECTION
Status: DISCONTINUED | OUTPATIENT
Start: 2021-08-07 | End: 2021-08-10 | Stop reason: HOSPADM

## 2021-08-07 RX ORDER — IBUPROFEN 600 MG/1
600 TABLET ORAL EVERY 6 HOURS
Status: DISCONTINUED | OUTPATIENT
Start: 2021-08-08 | End: 2021-08-07

## 2021-08-07 RX ORDER — LIDOCAINE HYDROCHLORIDE 10 MG/ML
5 INJECTION, SOLUTION EPIDURAL; INFILTRATION; INTRACAUDAL; PERINEURAL AS NEEDED
Status: DISCONTINUED | OUTPATIENT
Start: 2021-08-07 | End: 2021-08-07 | Stop reason: HOSPADM

## 2021-08-07 RX ORDER — SODIUM CHLORIDE, SODIUM LACTATE, POTASSIUM CHLORIDE, CALCIUM CHLORIDE 600; 310; 30; 20 MG/100ML; MG/100ML; MG/100ML; MG/100ML
125 INJECTION, SOLUTION INTRAVENOUS CONTINUOUS
Status: DISCONTINUED | OUTPATIENT
Start: 2021-08-07 | End: 2021-08-10 | Stop reason: HOSPADM

## 2021-08-07 RX ORDER — DOCUSATE SODIUM 100 MG/1
100 CAPSULE, LIQUID FILLED ORAL 2 TIMES DAILY PRN
Status: DISCONTINUED | OUTPATIENT
Start: 2021-08-07 | End: 2021-08-10 | Stop reason: HOSPADM

## 2021-08-07 RX ORDER — ACETAMINOPHEN 325 MG/1
650 TABLET ORAL EVERY 6 HOURS
Status: DISCONTINUED | OUTPATIENT
Start: 2021-08-08 | End: 2021-08-07

## 2021-08-07 RX ORDER — IBUPROFEN 600 MG/1
600 TABLET ORAL EVERY 6 HOURS
Status: DISCONTINUED | OUTPATIENT
Start: 2021-08-08 | End: 2021-08-10 | Stop reason: HOSPADM

## 2021-08-07 RX ORDER — SODIUM CHLORIDE, SODIUM LACTATE, POTASSIUM CHLORIDE, CALCIUM CHLORIDE 600; 310; 30; 20 MG/100ML; MG/100ML; MG/100ML; MG/100ML
INJECTION, SOLUTION INTRAVENOUS CONTINUOUS PRN
Status: DISCONTINUED | OUTPATIENT
Start: 2021-08-07 | End: 2021-08-07 | Stop reason: SURG

## 2021-08-07 RX ORDER — OXYCODONE HYDROCHLORIDE 5 MG/1
5 TABLET ORAL EVERY 4 HOURS PRN
Status: DISCONTINUED | OUTPATIENT
Start: 2021-08-07 | End: 2021-08-10 | Stop reason: HOSPADM

## 2021-08-07 RX ORDER — FENTANYL CITRATE 50 UG/ML
INJECTION, SOLUTION INTRAMUSCULAR; INTRAVENOUS
Status: COMPLETED | OUTPATIENT
Start: 2021-08-07 | End: 2021-08-07

## 2021-08-07 RX ORDER — OXYCODONE HYDROCHLORIDE 5 MG/1
10 TABLET ORAL EVERY 4 HOURS PRN
Status: DISCONTINUED | OUTPATIENT
Start: 2021-08-07 | End: 2021-08-10 | Stop reason: HOSPADM

## 2021-08-07 RX ADMIN — DOCUSATE SODIUM 100 MG: 100 CAPSULE, LIQUID FILLED ORAL at 20:28

## 2021-08-07 RX ADMIN — ONDANSETRON 4 MG: 2 INJECTION INTRAMUSCULAR; INTRAVENOUS at 00:43

## 2021-08-07 RX ADMIN — KETOROLAC TROMETHAMINE 30 MG: 30 INJECTION, SOLUTION INTRAMUSCULAR at 02:18

## 2021-08-07 RX ADMIN — DIPHENHYDRAMINE HYDROCHLORIDE 12.5 MG: 50 INJECTION INTRAMUSCULAR; INTRAVENOUS at 02:19

## 2021-08-07 RX ADMIN — DOCUSATE SODIUM 100 MG: 100 CAPSULE, LIQUID FILLED ORAL at 10:03

## 2021-08-07 RX ADMIN — DIPHENHYDRAMINE HYDROCHLORIDE 25 MG: 25 CAPSULE ORAL at 06:03

## 2021-08-07 RX ADMIN — ACETAMINOPHEN 1000 MG: 500 TABLET, FILM COATED ORAL at 12:42

## 2021-08-07 RX ADMIN — SODIUM CHLORIDE, POTASSIUM CHLORIDE, SODIUM LACTATE AND CALCIUM CHLORIDE 1000 ML: 600; 310; 30; 20 INJECTION, SOLUTION INTRAVENOUS at 00:09

## 2021-08-07 RX ADMIN — MORPHINE SULFATE 0.15 MG: 0.5 INJECTION, SOLUTION EPIDURAL; INTRATHECAL; INTRAVENOUS at 00:38

## 2021-08-07 RX ADMIN — KETOROLAC TROMETHAMINE 15 MG: 15 INJECTION, SOLUTION INTRAMUSCULAR; INTRAVENOUS at 20:28

## 2021-08-07 RX ADMIN — BUPIVACAINE HYDROCHLORIDE IN DEXTROSE 1.6 ML: 7.5 INJECTION, SOLUTION SUBARACHNOID at 00:38

## 2021-08-07 RX ADMIN — FENTANYL CITRATE 15 MCG: 50 INJECTION, SOLUTION INTRAMUSCULAR; INTRAVENOUS at 00:38

## 2021-08-07 RX ADMIN — KETOROLAC TROMETHAMINE 15 MG: 15 INJECTION, SOLUTION INTRAMUSCULAR; INTRAVENOUS at 16:28

## 2021-08-07 RX ADMIN — SODIUM CITRATE AND CITRIC ACID MONOHYDRATE 30 ML: 500; 334 SOLUTION ORAL at 00:20

## 2021-08-07 RX ADMIN — LABETALOL HYDROCHLORIDE 200 MG: 200 TABLET, FILM COATED ORAL at 20:28

## 2021-08-07 RX ADMIN — LABETALOL HYDROCHLORIDE 200 MG: 200 TABLET, FILM COATED ORAL at 06:03

## 2021-08-07 RX ADMIN — OXYTOCIN 500 ML: 10 INJECTION INTRAVENOUS at 00:56

## 2021-08-07 RX ADMIN — LABETALOL HYDROCHLORIDE 200 MG: 200 TABLET, FILM COATED ORAL at 13:41

## 2021-08-07 RX ADMIN — SIMETHICONE 80 MG: 80 TABLET, CHEWABLE ORAL at 10:03

## 2021-08-07 RX ADMIN — METOCLOPRAMIDE 10 MG: 5 INJECTION, SOLUTION INTRAMUSCULAR; INTRAVENOUS at 00:43

## 2021-08-07 RX ADMIN — ACETAMINOPHEN 1000 MG: 500 TABLET, FILM COATED ORAL at 00:06

## 2021-08-07 RX ADMIN — ACETAMINOPHEN 1000 MG: 500 TABLET, FILM COATED ORAL at 06:02

## 2021-08-07 RX ADMIN — SODIUM CHLORIDE, POTASSIUM CHLORIDE, SODIUM LACTATE AND CALCIUM CHLORIDE: 600; 310; 30; 20 INJECTION, SOLUTION INTRAVENOUS at 00:07

## 2021-08-07 RX ADMIN — FAMOTIDINE 20 MG: 10 INJECTION, SOLUTION INTRAVENOUS at 00:43

## 2021-08-07 RX ADMIN — KETOROLAC TROMETHAMINE 15 MG: 15 INJECTION, SOLUTION INTRAMUSCULAR; INTRAVENOUS at 10:03

## 2021-08-07 RX ADMIN — SIMETHICONE 80 MG: 80 TABLET, CHEWABLE ORAL at 18:34

## 2021-08-07 RX ADMIN — ACETAMINOPHEN 1000 MG: 500 TABLET, FILM COATED ORAL at 18:34

## 2021-08-07 RX ADMIN — PRENATAL VITAMINS-IRON FUMARATE 27 MG IRON-FOLIC ACID 0.8 MG TABLET 1 TABLET: at 10:04

## 2021-08-07 RX ADMIN — OXYTOCIN 500 ML: 10 INJECTION INTRAVENOUS at 01:18

## 2021-08-07 NOTE — NURSING NOTE
PRENATAL CONTACT - NDRT    Requested by OB to meet with parents to update them with delivery and admission procedures for their infant.    Present: mom and dad    Pertinent Prenatal Information:    Gestational Age: 31 6/7 weeks  US report:   Other:       The following issues were discussed:    1) Admission Procedure.  2) NICU Visitation Policy.  3) Sibling Visitation Policy  4) Skin to Skin Care ([facility]).  5) Hand Expression for Breast Milk soon after birth.  6) Breast Feeding/Expressing Breast Milk.  7) Tour of NICU offered. No time for tour  8) Other Issues Discussed: none      Concerns Voiced by Parents: none at this time        Jodi Rudolph RN    8/7/2021   00:02 EDT

## 2021-08-07 NOTE — LACTATION NOTE
Mother's 4th baby. Pumped x 1 month with other 3. Really  Wants to succeed this time, states all other children have cow milk allergies. Infant in NICU. 31.6 week gestation infant. Pump set-up by nursing and mother has been pumping. [facility] grade,[employer], double electric pump. Follow up Instructed in pumping every 2-3hr, initiation phase. Instructed hand expression. Instructed pump part cleaning after each use and sterilization every 24hr.  Instructed breastmilk storage per NICU guidelines. Given Pump-2-premie rx. Educate/ support

## 2021-08-07 NOTE — ANESTHESIA PROCEDURE NOTES
Spinal Block      Patient location during procedure: OR  Start Time: 8/7/2021 12:37 AM  Stop Time: 8/7/2021 12:39 AM  Indication:at surgeon's request  Performed By  Anesthesiologist: Saturnino Cruz MD  Preanesthetic Checklist  Completed: patient identified, IV checked, site marked, risks and benefits discussed, surgical consent, monitors and equipment checked, pre-op evaluation and timeout performed  Spinal Block Prep:  Patient Position:sitting  Sterile Tech:cap, gloves, sterile barriers and mask  Prep:Betadine  Patient Monitoring:EKG, continuous pulse oximetry and blood pressure monitoring  Spinal Block Procedure  Approach:midline  Guidance:palpation technique  Location:L2-L3  Needle Type:Christy  Needle Gauge:25 G  Placement of Spinal needle event:cerebrospinal fluid aspirated  Paresthesia: no  Fluid Appearance:clear  Medications: bupivacaine in dextrose (MARCAINE SPINAL / Hyberbaric) 0.75-8.25 % injection, 1.6 mL  morphine PF (ASTRAMORPH) injection, 0.15 mg  fentaNYL citrate (PF) (SUBLIMAZE) injection, 15 mcg  Med Administered at 8/7/2021 12:38 AM   Post Assessment  Patient Tolerance:patient tolerated the procedure well with no apparent complications  Complications no

## 2021-08-07 NOTE — ANESTHESIA PREPROCEDURE EVALUATION
Anesthesia Evaluation     Patient summary reviewed and Nursing notes reviewed                Airway   Mallampati: I  TM distance: >3 FB  Neck ROM: full  No difficulty expected  Dental - normal exam     Pulmonary - negative pulmonary ROS and normal exam   Cardiovascular - negative cardio ROS and normal exam        Neuro/Psych- negative ROS  GI/Hepatic/Renal/Endo - negative ROS     Musculoskeletal (-) negative ROS    Abdominal  - normal exam    Bowel sounds: normal.   Substance History - negative use     OB/GYN    (+) Pregnant, Preeclampsia, pregnancy induced hypertension        Other                        Anesthesia Plan    ASA 3 - emergent     spinal       Anesthetic plan, all risks, benefits, and alternatives have been provided, discussed and informed consent has been obtained with: patient.  Use of blood products discussed with patient .   Plan discussed with attending.

## 2021-08-07 NOTE — ANESTHESIA POSTPROCEDURE EVALUATION
Patient: Gregoria Miguel    Procedure Summary     Date: 21 Room / Location: [facility] LABOR DELIVERY   [facility]    Anesthesia Start: 27 Anesthesia Stop: 139    Procedure:  SECTION REPEAT (N/A Abdomen) Diagnosis:     Surgeons: Esvin Campbell MD Provider: Saturnino Cruz MD    Anesthesia Type: spinal ASA Status: 3 - Emergent          Anesthesia Type: spinal    Vitals  Vitals Value Taken Time   /89 21 1250   Temp 98.2 °F (36.8 °C) 21 1250   Pulse 61 21 1250   Resp 18 21 1250   SpO2 100 % 21 0800           Post Anesthesia Care and Evaluation    Patient location during evaluation: bedside  Patient participation: complete - patient participated  Level of consciousness: awake  Pain score: 1  Pain management: satisfactory to patient  Airway patency: patent  Anesthetic complications: No anesthetic complications  PONV Status: none  Cardiovascular status: acceptable and hemodynamically stable  Respiratory status: acceptable  Hydration status: acceptable  Post Neuraxial Block status: Motor and sensory function returned to baseline and No signs or symptoms of PDPH

## 2021-08-07 NOTE — ANESTHESIA POSTPROCEDURE EVALUATION
Patient: Gregoria Miguel    Procedure Summary     Date: 21 Room / Location: [facility] LABOR DELIVERY   [facility]    Anesthesia Start: 27 Anesthesia Stop: 139    Procedure:  SECTION REPEAT (N/A Abdomen) Diagnosis:     Surgeons: Esvin Campbell MD Provider: Saturnino Cruz MD    Anesthesia Type: spinal ASA Status: 3 - Emergent          Anesthesia Type: spinal    Vitals  Vitals Value Taken Time   /74 21 0155   Temp 98.1 °F (36.7 °C) 21 0133   Pulse 57 21 0200   Resp 18 21 0135   SpO2 98 % 21 0200   Vitals shown include unvalidated device data.        Post Anesthesia Care and Evaluation    Patient location during evaluation: bedside  Patient participation: complete - patient participated  Level of consciousness: awake and alert  Pain score: 0  Pain management: adequate  Airway patency: patent  Anesthetic complications: No anesthetic complications    Cardiovascular status: acceptable  Respiratory status: acceptable  Hydration status: acceptable    Comments: She is itching in RR

## 2021-08-07 NOTE — OP NOTE
Segundo Miguel  : 1990  MRN: [id number]  CSN: [id number]     Referring Provider: Lawrence Ford         Section Operative Note    Pre-Operative Dx: 1.   Intrauterine pregnancy at 31w6d weeks 2.   Non-reassuring fetal status  3. IUGR   4. AEDF      Postoperative dx:  1.   Intrauterine pregnancy at 31w6d weeks 2.   Same           Procedure: Repeat  (LTCS)       Surgeon: Esvin Campbell MD   Assistant: Chen Mcgrath       Anesthesia: Spinal        EBL: 350 mls.   IV Fluids: 1400 mls.   UOP: 800 mls.     Antibiotics: cefazolin 2 gms     Infant:      Name:  Shukri      Gender: male  infant    Weight: 1260 g (2 lb 12.4 oz)     Apgars: 4  @ 1 minute / 8  @ 5 minutes     Procedure Details:   After the patient was adequately anesthetized, she was sterilely prepped and draped in the dorsal supine left lateral tilt position. A Pfannenstiel incision was created sharply with the knife. It was carried down to the fascia with the Bovie.  The fascia was cut transversely with the knife and extended in a curvilinear fashion with scissors.   This had some element of difficulty due to increased scar tissue. The fascia was freed from its midline insertion superiorly and inferiorly. Rectus muscles were  in the midline. The peritoneum was sharply entered and a bladder flap was not sharply created. The lower uterine segment was scored transversely with the knife. Clear amniotic fluid was seen. The infant's head was delivered atraumatically. The mouth and nose were bulb suctioned. The cord was quickly milked 4 times prior to being clamped and cut. The infant was handed to the delivery team which was in attendance. The placenta ws challenging to remove requiring manual shearing to be extracted. The uterus was thoroughly checked for placenta fragments and after good visualization the uterus was clear of products of conception.  The uterus was exteriorized and wiped free of debris and clot. The  uterine incision was closed with #1 Monocryl in a continuous running locking fashion. A second #1 Monocryl was used to imbricate across the first.  A third #1 Monocryl was used to close the serosa due to the thickened lower segment    The uterus was returned to the abdomen. The paracolic gutters were cleared of debris and clot.  The lower area above the bladder was raw and although had some improvement in oozing blood, required surgicel snow for hemostasis.  The fascia was closed with 0 PDS. The subcutaneous tissue was copiously irrigated. Subcutaneous tissue was reapproximated with 3-0 Plain Gut and the skin was closed with Insorb staples subcuticularly. An Exofin dressing was applied followed by a pressure dressing due to the increased oozing of blood.  All counts were correct.         Complications:   None       Disposition:   Mother to Mother Baby/Postpartum  in stable condition currently.   Baby to NICU  in stable condition currently.       Esvin Campbell MD  8/7/2021  01:38 EDT

## 2021-08-07 NOTE — PROGRESS NOTES
Labourist:      Ms. Miguel has had another prolonged decel that dropped into the 70s.  She has known fetal growth restriction and AEDF.   She therapeutic on steroids.      FHTs have recovered, but still concerning.  Discussed with MFM as to recommendation and based on therapeutic and the known problems, he recommended proceeding with delivery.    NICU Notified  Awaiting for her  to come in and will proceed with RLTCS

## 2021-08-08 ENCOUNTER — ANESTHESIA EVENT (OUTPATIENT)
Dept: LABOR AND DELIVERY | Facility: HOSPITAL | Age: 31
End: 2021-08-08

## 2021-08-08 ENCOUNTER — ANESTHESIA (OUTPATIENT)
Dept: LABOR AND DELIVERY | Facility: HOSPITAL | Age: 31
End: 2021-08-08

## 2021-08-08 LAB
BASOPHILS # BLD AUTO: 0.03 10*3/MM3 (ref 0–0.2)
BASOPHILS NFR BLD AUTO: 0.4 % (ref 0–1.5)
DEPRECATED RDW RBC AUTO: 43.5 FL (ref 37–54)
EOSINOPHIL # BLD AUTO: 0.04 10*3/MM3 (ref 0–0.4)
EOSINOPHIL NFR BLD AUTO: 0.6 % (ref 0.3–6.2)
ERYTHROCYTE [DISTWIDTH] IN BLOOD BY AUTOMATED COUNT: 12.8 % (ref 12.3–15.4)
HCT VFR BLD AUTO: 35 % (ref 34–46.6)
HGB BLD-MCNC: 11.3 G/DL (ref 12–15.9)
IMM GRANULOCYTES # BLD AUTO: 0.06 10*3/MM3 (ref 0–0.05)
IMM GRANULOCYTES NFR BLD AUTO: 0.8 % (ref 0–0.5)
LYMPHOCYTES # BLD AUTO: 2.27 10*3/MM3 (ref 0.7–3.1)
LYMPHOCYTES NFR BLD AUTO: 31.4 % (ref 19.6–45.3)
MCH RBC QN AUTO: 30.1 PG (ref 26.6–33)
MCHC RBC AUTO-ENTMCNC: 32.3 G/DL (ref 31.5–35.7)
MCV RBC AUTO: 93.1 FL (ref 79–97)
MONOCYTES # BLD AUTO: 0.53 10*3/MM3 (ref 0.1–0.9)
MONOCYTES NFR BLD AUTO: 7.3 % (ref 5–12)
NEUTROPHILS NFR BLD AUTO: 4.3 10*3/MM3 (ref 1.7–7)
NEUTROPHILS NFR BLD AUTO: 59.5 % (ref 42.7–76)
NRBC BLD AUTO-RTO: 0 /100 WBC (ref 0–0.2)
PLATELET # BLD AUTO: 198 10*3/MM3 (ref 140–450)
PMV BLD AUTO: 10.7 FL (ref 6–12)
RBC # BLD AUTO: 3.76 10*6/MM3 (ref 3.77–5.28)
WBC # BLD AUTO: 7.23 10*3/MM3 (ref 3.4–10.8)

## 2021-08-08 PROCEDURE — 25010000002 KETOROLAC TROMETHAMINE PER 15 MG: Performed by: OBSTETRICS & GYNECOLOGY

## 2021-08-08 PROCEDURE — 0503F POSTPARTUM CARE VISIT: CPT | Performed by: OBSTETRICS & GYNECOLOGY

## 2021-08-08 PROCEDURE — 85025 COMPLETE CBC W/AUTO DIFF WBC: CPT | Performed by: OBSTETRICS & GYNECOLOGY

## 2021-08-08 RX ORDER — NIFEDIPINE 10 MG/1
10 CAPSULE ORAL ONCE
Status: COMPLETED | OUTPATIENT
Start: 2021-08-08 | End: 2021-08-08

## 2021-08-08 RX ORDER — LABETALOL 300 MG/1
300 TABLET, FILM COATED ORAL EVERY 8 HOURS SCHEDULED
Status: DISCONTINUED | OUTPATIENT
Start: 2021-08-08 | End: 2021-08-10 | Stop reason: HOSPADM

## 2021-08-08 RX ADMIN — KETOROLAC TROMETHAMINE 15 MG: 15 INJECTION, SOLUTION INTRAMUSCULAR; INTRAVENOUS at 03:26

## 2021-08-08 RX ADMIN — ACETAMINOPHEN 1000 MG: 500 TABLET, FILM COATED ORAL at 00:40

## 2021-08-08 RX ADMIN — LABETALOL HYDROCHLORIDE 300 MG: 300 TABLET, FILM COATED ORAL at 22:42

## 2021-08-08 RX ADMIN — ACETAMINOPHEN 650 MG: 325 TABLET, FILM COATED ORAL at 18:58

## 2021-08-08 RX ADMIN — LABETALOL HYDROCHLORIDE 300 MG: 300 TABLET, FILM COATED ORAL at 13:53

## 2021-08-08 RX ADMIN — DOCUSATE SODIUM 100 MG: 100 CAPSULE, LIQUID FILLED ORAL at 10:03

## 2021-08-08 RX ADMIN — SIMETHICONE 80 MG: 80 TABLET, CHEWABLE ORAL at 10:03

## 2021-08-08 RX ADMIN — IBUPROFEN 600 MG: 600 TABLET ORAL at 10:03

## 2021-08-08 RX ADMIN — PRENATAL VITAMINS-IRON FUMARATE 27 MG IRON-FOLIC ACID 0.8 MG TABLET 1 TABLET: at 13:53

## 2021-08-08 RX ADMIN — IBUPROFEN 600 MG: 600 TABLET ORAL at 15:56

## 2021-08-08 RX ADMIN — LABETALOL HYDROCHLORIDE 200 MG: 200 TABLET, FILM COATED ORAL at 06:33

## 2021-08-08 RX ADMIN — DOCUSATE SODIUM 100 MG: 100 CAPSULE, LIQUID FILLED ORAL at 19:57

## 2021-08-08 RX ADMIN — ACETAMINOPHEN 650 MG: 325 TABLET, FILM COATED ORAL at 12:59

## 2021-08-08 RX ADMIN — SIMETHICONE 80 MG: 80 TABLET, CHEWABLE ORAL at 15:56

## 2021-08-08 RX ADMIN — NIFEDIPINE 10 MG: 10 CAPSULE ORAL at 10:03

## 2021-08-08 RX ADMIN — NIFEDIPINE 10 MG: 10 CAPSULE ORAL at 17:39

## 2021-08-08 RX ADMIN — IBUPROFEN 600 MG: 600 TABLET ORAL at 22:40

## 2021-08-08 RX ADMIN — ACETAMINOPHEN 650 MG: 325 TABLET, FILM COATED ORAL at 23:50

## 2021-08-08 RX ADMIN — ACETAMINOPHEN 650 MG: 325 TABLET, FILM COATED ORAL at 06:32

## 2021-08-08 NOTE — PROGRESS NOTES
"  Laborist Post Partum Note: Postpartum Day: #1     Patient Name:  Gregoria Miguel  :  1990  MRN:  [id number]  Referring Physician: Lawrence Ford     Chief complaint:   Chief Complaint   Patient presents with   • Elevated Blood Pressure        S.  Patient denies headache, scotomata or epigastric pain.  Resting comfortably.    O.     Vitals:    21 0941   BP: 169/91   Pulse: 66   Resp: 18   Temp: 98.7 °F (37.1 °C)   SpO2:        Examination              Chest: Clear to auscultation.  Normal air movement.              Heart: Regular rate and rhythm without murmurs, gallops or rubs.              Abdomen: Incision clean, dry, and intact with subcuticular suture in place.  Pressure dressing removed.   Extremities: No calf tenderness.       A.    Postop day #1 status post  section secondary to gestational hypertension.  Call earlier this morning secondary to a severe range blood pressure.  Treated with a one-time dose of nifedipine 10 mg with normotensive readings subsequently.  Labetalol increased to 300 mg every 8 hours.      P.  Continue to follow blood pressure.  If repeat severe range pressure, will add nifedipine.    Oracio Luu \"Henderson\" TREVON De La Rosa MD  10:52 EDT  21  "

## 2021-08-08 NOTE — ANESTHESIA PROCEDURE NOTES
Blood Patch      Patient reassessed immediately prior to procedure    Patient location during procedure: holding area  Blood patch placed: 8/8/2021 6:15 PM  Stop Time:8/8/2021 6:23 PM    Indications for Blood Patch: dural puncture and headache  Staff  Anesthesiologist: Saturnino Cruz MD  Preanesthetic Checklist  Completed: patient identified, IV checked, site marked, risks and benefits discussed, surgical consent, monitors and equipment checked, pre-op evaluation and timeout performed  Blood Patch Prep  Patient position: sitting  Sterile Tech: sterile barrier, cap, gloves and mask.  Prep: Betadine  Patient monitoring: EKG, continuous pulse ox and blood pressure monitoring  Location: L1-L2  Blood patch source: left antecubital IV.  Blood Patch Procedure  Sedation:no    Approach: midline   Guidance: palpation technique  Needle Gauge 17 G  Needle Type: Tuohy  Solution used: autologous blood  Loss of Resistance: 6 cm  Loss of Resistance Medium: air  Blood Patch Source:venous    Amount injected: 25 mL  Assessment  Patient tolerance:patient tolerated the procedure well with no apparent complications  Complications:no

## 2021-08-08 NOTE — ANESTHESIA PREPROCEDURE EVALUATION
Anesthesia Evaluation     Patient summary reviewed and Nursing notes reviewed                Airway   Mallampati: I  TM distance: >3 FB  Neck ROM: full  No difficulty expected  Dental - normal exam     Pulmonary - negative pulmonary ROS and normal exam   Cardiovascular - negative cardio ROS and normal exam        Neuro/Psych- negative ROS  GI/Hepatic/Renal/Endo - negative ROS     Musculoskeletal (-) negative ROS    Abdominal  - normal exam    Bowel sounds: normal.   Substance History - negative use     OB/GYN    (+) Pregnant, Preeclampsia, pregnancy induced hypertension        Other            Phys Exam Other: She has a positional headache which ocures when sitting or standing.  Her headache did not start until this AM.  She has a similar experience with her last pregnancy.  The headache lasted 1 week after returning home and then went away.                Anesthesia Plan    ASA 3 - emergent     epidural   (She is to receive some procardia and if headache not relieved will do a blood patch.)    Anesthetic plan, all risks, benefits, and alternatives have been provided, discussed and informed consent has been obtained with: patient.  Use of blood products discussed with patient .   Plan discussed with attending.

## 2021-08-09 ENCOUNTER — APPOINTMENT (OUTPATIENT)
Dept: WOMENS IMAGING | Facility: HOSPITAL | Age: 31
End: 2021-08-09

## 2021-08-09 LAB — SARS-COV-2 RDRP RESP QL NAA+PROBE: NORMAL

## 2021-08-09 PROCEDURE — 0503F POSTPARTUM CARE VISIT: CPT | Performed by: OBSTETRICS & GYNECOLOGY

## 2021-08-09 PROCEDURE — 87635 SARS-COV-2 COVID-19 AMP PRB: CPT | Performed by: OBSTETRICS & GYNECOLOGY

## 2021-08-09 RX ORDER — NIFEDIPINE 30 MG/1
30 TABLET, EXTENDED RELEASE ORAL EVERY 12 HOURS SCHEDULED
Status: DISCONTINUED | OUTPATIENT
Start: 2021-08-09 | End: 2021-08-10 | Stop reason: HOSPADM

## 2021-08-09 RX ADMIN — LABETALOL HYDROCHLORIDE 300 MG: 300 TABLET, FILM COATED ORAL at 15:47

## 2021-08-09 RX ADMIN — SIMETHICONE 80 MG: 80 TABLET, CHEWABLE ORAL at 05:35

## 2021-08-09 RX ADMIN — IBUPROFEN 600 MG: 600 TABLET ORAL at 09:52

## 2021-08-09 RX ADMIN — DOCUSATE SODIUM 100 MG: 100 CAPSULE, LIQUID FILLED ORAL at 19:54

## 2021-08-09 RX ADMIN — NIFEDIPINE 30 MG: 30 TABLET, FILM COATED, EXTENDED RELEASE ORAL at 13:21

## 2021-08-09 RX ADMIN — IBUPROFEN 600 MG: 600 TABLET ORAL at 03:50

## 2021-08-09 RX ADMIN — SIMETHICONE 80 MG: 80 TABLET, CHEWABLE ORAL at 19:54

## 2021-08-09 RX ADMIN — LABETALOL HYDROCHLORIDE 300 MG: 300 TABLET, FILM COATED ORAL at 05:36

## 2021-08-09 RX ADMIN — PRENATAL VITAMINS-IRON FUMARATE 27 MG IRON-FOLIC ACID 0.8 MG TABLET 1 TABLET: at 09:53

## 2021-08-09 RX ADMIN — ACETAMINOPHEN 650 MG: 325 TABLET, FILM COATED ORAL at 05:35

## 2021-08-09 RX ADMIN — IBUPROFEN 600 MG: 600 TABLET ORAL at 19:54

## 2021-08-09 NOTE — PAYOR COMM NOTE
"Gregoria Miguel (30 y.o. Female)     Auth#[id number]    Clinical update.    From: Swetha Crenshaw  #[phone]  Fax#[phone]      Date of Birth Social Security Number Address Home Phone MRN    1990  [address] [place] [address] [phone] [id number]    Jehovah's witness Marital Status          None        Admission Date Admission Type Admitting Provider Attending Provider Department, Room/Bed    8/4/21 Elective Major Castaneda, Major Dejesus,  [facility] MOTHER BABY 4B, N429/1    Discharge Date Discharge Disposition Discharge Destination                       Attending Provider: Major Castaneda DO    Allergies: Penicillins    Isolation: None   Infection: COVID Screen (preop/placement) (08/09/21)   Code Status: CPR    Ht: 167.6 cm (66\")   Wt: 87.5 kg (193 lb)    Admission Cmt: None   Principal Problem: Chronic hypertension with superimposed preeclampsia [O11.9]                 Active Insurance as of 8/4/2021     Primary Coverage     Payor Plan Insurance Group Employer/Plan Group    ANTHEM BLUE CROSS ANTHEM BLUE CROSS BLUE SHIELD PPO [id number]     Payor Plan Address Payor Plan Phone Number Payor Plan Fax Number Effective Dates    [address] [phone]  1/1/2019 - None Entered    [facility] [id number]       Subscriber Name Subscriber Birth Date Member ID       SHEEBAMAAMED [birth year]1990 [id number]                 Emergency Contacts      (Rel.) Home Phone Work Phone Mobile Phone    Elda Lorenzo (Mother) -- -- [phone]    PHI MIGUEL (Spouse) [phone] -- [phone]            Insurance Information                ANTHEM BLUE CROSS/ANTHEM BLUE CROSS BLUE SHIELD PPO Phone: [phone]    Subscriber: Phi Miguel Subscriber#: [id number]    Group#: [id number] Precert#:           Problem List         Codes Noted - Resolved       Hospital    IUGR (intrauterine growth restriction) affecting care of mother, third trimester, fetus 1 " ICD-10-CM: O36.5931  ICD-9-CM: 656.53 2021 - Present    * (Principal) Chronic hypertension with superimposed preeclampsia ICD-10-CM: O11.9  ICD-9-CM: 642.70 2021 - Present    Chronic hypertension in pregnancy ICD-10-CM: O10.919  ICD-9-CM: 642.00 2021 - Present    Previous  section ICD-10-CM: Z98.891  ICD-9-CM: V45.89 2021 - Present       Non-Hospital    Echogenic focus of heart of fetus affecting antepartum care of mother ICD-10-CM: O35.8XX0  ICD-9-CM: 655.83 2021 - Present    Pregnancy ICD-10-CM: Z34.90  ICD-9-CM: V22.2 2019 - Present             History & Physical      Major Castaneda DO at 21 1708          [facility]  Obstetric History and Physical    Referring Provider: Lawrence Ford MD      Chief Complaint   Patient presents with   • Elevated Blood Pressure       Subjective     Patient is a 30 y.o. female  currently at 31w3d, who presents as a transfer from [place] for severe range hypertension, fetus with IUGR.( 10%) and absent end-diastolic flow.   course complicated by chronic hypertension,  delivery x2, prior  x1, and prior pregnancy with preeclampsia.  Patient was seen by provider for  visit and  interval growth scan.  Fetus at the 10th percentile with absent end-diastolic flow. Blood pressure reported 160/100.  Patient was sent to labor and delivery.  Blood pressure remained in the severe range therefore she was started on magnesium sulfate and given her first dose of betamethasone.  Transferred to [facility] due to early gestation and NICU capabilities.  Patient currently denies any complaints.  Patient denies headache, nausea, vomiting, leaking of fluid, vaginal bleeding, anorexia, fever, recent trauma or any others so systems or concerns.  Patient reports normal fetal activity.  Patient daily medications include baby aspirin daily, labetalol 200 mg at breakfast 100 mg at lunch and  evening.    Her prenatal care is complicated by  hypertension  chronic hypertension, prior   desires repeat  and abnormal fetal growth  IUGR.  Her previous obstetric/gynecological history is remarkable for .    The following portions of the patients history were reviewed and updated as appropriate: current medications, allergies, past medical history, past surgical history, past family history, past social history and problem list .       Prenatal Information:   Maternal Prenatal Labs  Blood Type No results found for: ABO   Rh Status No results found for: RH   Antibody Screen No results found for: ABSCRN   Gonnorhea No results found for: GCCX   Chlamydia No results found for: CLAMYDCU   RPR No results found for: RPR   Syphilis Antibody No results found for: SYPHILIS   Rubella No results found for: RUBELLAIGGIN   Hepatitis B Surface Antigen No results found for: HEPBSAG   HIV-1 Antibody No results found for: LABHIV1   Hepatitis C Antibody No results found for: HEPCAB   Rapid Urin Drug Screen No results found for: AMPMETHU, BARBITSCNUR, LABBENZSCN, LABMETHSCN, LABOPIASCN, THCURSCR, COCAINEUR, AMPHETSCREEN, PROPOXSCN, BUPRENORSCNU, METAMPSCNUR, OXYCODONESCN, TRICYCLICSCN   Group B Strep Culture No results found for: GBSANTIGEN           External Prenatal Results     Pregnancy Outside Results - Transcribed From Office Records - See Scanned Records For Details     Test Value Date Time    ABO  O  19 1702    Rh  Positive  19 1702    Antibody Screen  Negative  19 1702    Varicella IgG       Rubella       Hgb  12.2 g/dL 19 0734    Hct  38.4 % 19 0734    Glucose Fasting GTT       Glucose Tolerance Test 1 hour       Glucose Tolerance Test 3 hour       Gonorrhea (discrete)       Chlamydia (discrete)       RPR       VDRL       Syphilis Antibody       HBsAg       Herpes Simplex Virus PCR       Herpes Simplex VIrus Culture       HIV       Hep C RNA Quant PCR       Hep C Antibody        AFP       Group B Strep       GBS Susceptibility to Clindamycin       GBS Susceptibility to Erythromycin       Fetal Fibronectin       Genetic Testing, Maternal Blood             Drug Screening     Test Value Date Time    Urine Drug Screen       Amphetamine Screen       Barbiturate Screen       Benzodiazepine Screen       Methadone Screen       Phencyclidine Screen       Opiates Screen       THC Screen       Cocaine Screen       Propoxyphene Screen       Buprenorphine Screen       Methamphetamine Screen       Oxycodone Screen       Tricyclic Antidepressants Screen             Legend    ^: Historical                          Past OB History:       OB History    Para Term  AB Living   5 3 1 2 1 3   SAB TAB Ectopic Molar Multiple Live Births   0 0 0 0 0 3      # Outcome Date GA Lbr Timur/2nd Weight Sex Delivery Anes PTL Lv   5 Current            4  19 35w2d  2300 g (5 lb 1.1 oz) M CS-LTranv Spinal N OMER      Name: NIRMAL ALY      Apgar1: 8  Apgar5: 9   3 Term 12/10/14 39w0d  3714 g (8 lb 3 oz) M CS-LVertical Spinal N OMER      Complications: Breech presentation   2  12 36w0d  3033 g (6 lb 11 oz) M Vag-Spont EPI Y OMER   1 AB 11 5w0d    SAB         Obstetric Comments   FOB 1, G1,2   Fob 2, G3,4,5       Past Medical History: Past Medical History:   Diagnosis Date   • Hypertension       Past Surgical History Past Surgical History:   Procedure Laterality Date   •  SECTION     •  SECTION N/A 2019    Procedure:  SECTION REPEAT;  Surgeon: Kaylee Mendoza MD;  Location: [facility] LABOR DELIVERY;  Service: Obstetrics/Gynecology   • LAPAROSCOPIC CHOLECYSTECTOMY     • WISDOM TOOTH EXTRACTION        Family History: No family history on file.   Social History:  reports that she has never smoked. She has never used smokeless tobacco.   reports no history of alcohol use.   reports no history of drug use.                   General ROS Negative  Findings:Headaches, Visual Changes, Epigastric pain, Anorexia, Nausia/Vomiting, ROM and Vaginal Bleeding    ROS     All other systems have been reviewed and are neg  Objective       Vital Signs Range for the last 24 hours  Temperature:     Temp Source:     BP:     Pulse:     Respirations:     SPO2:     O2 Amount (l/min):     O2 Devices     Weight: Weight:  [87.5 kg (193 lb)] 87.5 kg (193 lb)     Physical Examination:   General:   alert, appears stated age and cooperative   Skin:   normal   HEENT:  Sclera clear   Lungs:   clear to auscultation bilaterally   Heart:   regular rate and rhythm, S1, S2 normal, no murmur, click, rub or gallop   Gastrointestinal:  Abdomen soft, gravid uterus, guarding benign exam   Lower Extremities:  No edema, no calf tenderness   : Exam deferred.   Musculoskeletal:   No deformities, full range of motion upper lower extremity   Neuro:  No focal deficit, DTR 2+ 4 no clonus               Fetal Heart Rate Assessment   Method:     Beats/min:     Baseline:     Varibility:     Accels:     Decels:     Tracing Category:       Uterine Assessment   Method:     Frequency (min):     Ctx Count in 10 min:     Duration:     Intensity:     Intensity by IUPC:     Resting Tone:     Resting Tone by IUPC:     Marshallberg Units:       Laboratory Results:   Lab Results (last 24 hours)     ** No results found for the last 24 hours. **        Radiology Review:   Imaging Results (Last 24 Hours)     ** No results found for the last 24 hours. **        Other Studies:    Assessment/Plan       Chronic hypertension with superimposed preeclampsia        Assessment:  1.  Intrauterine pregnancy at 31w3d weeks gestation with reactive fetal status.    2.  Chronic hypertension with superimposed preeclampsia  3.  IUGR 10th percentile with absent end-diastolic flow by ultrasound in [facility]  4.  Prior  x2  5.  History of preeclampsia with prior pregnancy    Plan:  1.  Admit, labs, control blood pressure,  continue magnesium sulfate antiseizure prophylaxis, complete steroids for fetal benefit, NICU consult, PDC ultrasound in morning, and  continuous EFM  2. Plan of care has been reviewed with patient.  3.  Risks, benefits of treatment plan have been discussed.  4.  All questions have been answered.  5      Major Castaneda DO  8/4/2021  17:12 EDT    Electronically signed by Major Castaneda DO at 08/04/21 2006     H&P signed by [employer], [place] at 08/04/21 2049      Scan on 8/4/2021 by [employer], [place]: H&amp;P, PRENATAL RECORDS, 08/04/2021          Electronically signed by [employer], [place] at 08/04/21 2049         Facility-Administered Medications as of 8/9/2021   Medication Dose Route Frequency Provider Last Rate Last Admin   • acetaminophen (TYLENOL) tablet 1,000 mg  1,000 mg Oral Q6H PRN Esvin Campbell MD   1,000 mg at 08/07/21 0006   • [COMPLETED] acetaminophen (TYLENOL) tablet 1,000 mg  1,000 mg Oral Q6H Esvin Campbell MD   1,000 mg at 08/08/21 0040    Followed by   • acetaminophen (TYLENOL) tablet 650 mg  650 mg Oral Q6H Esvin Campbell MD   650 mg at 08/09/21 0535   • aluminum-magnesium hydroxide-simethicone (MAALOX MAX) 400-400-40 MG/5ML suspension 15 mL  15 mL Oral Q4H PRN Esvin Campbell MD        Or   • calcium carbonate (TUMS) chewable tablet 500 mg (200 mg elemental)  1 tablet Oral Q4H PRN Esvin Campbell MD       • [COMPLETED] betamethasone acetate-betamethasone sodium phosphate (CELESTONE SOLUSPAN) injection 12 mg  12 mg Intramuscular Once Major Castaneda DO   12 mg at 08/05/21 1252   • [COMPLETED] ceFAZolin in dextrose (ANCEF) 2-4 GM/100ML-% IVPB solution  - ADS Override Pull        2 g at 08/06/21 0670   • dextrose 5 % and sodium chloride 0.2 % infusion  100 mL/hr Intravenous Continuous Major Castaneda DO   Stopped at 08/06/21 0822   • diphenhydrAMINE (BENADRYL) capsule 25 mg  25 mg Oral Nightly PREsvin Gomez MD   25 mg at 08/07/21 0603   • [COMPLETED]  diphenhydrAMINE (BENADRYL) injection 12.5 mg  12.5 mg Intravenous Once Saturnino Cruz MD   12.5 mg at 21 0219   • diphenhydrAMINE (BENADRYL) injection 25 mg  25 mg Intravenous Q6H PRN Saturnino Cruz MD       • docusate sodium (COLACE) capsule 100 mg  100 mg Oral BID Major Castaneda, DO   100 mg at 21 1003   • docusate sodium (COLACE) capsule 100 mg  100 mg Oral BID PRN Esvin Campbell MD   100 mg at 21 1957   • [COMPLETED] famotidine (PEPCID) tablet 20 mg  20 mg Oral Once Esvin Campbell MD   20 mg at 21 0201   • [COMPLETED] famotidine (PEPCID) tablet 20 mg  20 mg Oral Once Esvin Campbell MD   20 mg at 21 2242   • Hydrocortisone (Perianal) (ANUSOL-HC) 2.5 % rectal cream 1 application  1 application Rectal PRN Esvin Campbell MD       • [COMPLETED] ketorolac (TORADOL) injection 15 mg  15 mg Intravenous Q6H Esvin Campbell MD   15 mg at 21 0326    Followed by   • ibuprofen (ADVIL,MOTRIN) tablet 600 mg  600 mg Oral Q6H Esvin Campbell MD   600 mg at 21 0952   • [COMPLETED] ketorolac (TORADOL) injection 30 mg  30 mg Intravenous Once Esvin Campbell MD   30 mg at 21 0218   • labetalol (NORMODYNE) tablet 300 mg  300 mg Oral Q8H Oracio De La Rosa III, MD   300 mg at 21 1547   • labetalol (NORMODYNE,TRANDATE) 5 MG/ML injection  - ADS Override Pull            • [] labetalol (NORMODYNE,TRANDATE) injection 20-80 mg  20-80 mg Intravenous Q10 Min PRN Major Castaneda, DO   40 mg at 21 1720   • [COMPLETED] lactated ringers bolus 1,000 mL  1,000 mL Intravenous Once Esvin Campbell MD 4,000 mL/hr at 21 1,000 mL at 21   • lactated ringers infusion  125 mL/hr Intravenous Continuous Esvin Campbell MD       • lanolin cream   Topical Q1H PRN Esvin Campbell MD       • lidocaine PF 1% (XYLOCAINE) injection 5 mL  5 mL Intradermal PRN Major Castaneda L, DO       • [] magnesium sulfate 20 GM/500ML infusion  - ADS Override Pull             • [] magnesium sulfate 20 GM/500ML infusion  1 g/hr Intravenous Continuous Esvin Campbell MD   Stopped at 21 0056   • morphine injection 2 mg  2 mg Intravenous Q4H PRN Esvin Campbell MD        And   • naloxone (NARCAN) injection 0.4 mg  0.4 mg Intravenous Q5 Min PRN Esvin Campbell MD       • [COMPLETED] NIFEdipine (PROCARDIA) capsule 10 mg  10 mg Oral Once Oracio De La Rosa III, MD   10 mg at 21 1003   • [COMPLETED] NIFEdipine (PROCARDIA) capsule 10 mg  10 mg Oral Once Oracio De La Rosa III, MD   10 mg at 21 1739   • NIFEdipine XL (PROCARDIA XL) 24 hr tablet 30 mg  30 mg Oral Q12H Oracio De La Rosa III, MD   30 mg at 21 1321   • ondansetron (ZOFRAN) tablet 4 mg  4 mg Oral Q8H PRN Major Castaneda, DO        Or   • ondansetron (ZOFRAN) injection 4 mg  4 mg Intravenous Q8H PRN Major Castaneda, DO       • oxyCODONE (ROXICODONE) immediate release tablet 5 mg  5 mg Oral Q4H PRN Esvin Campbell MD        Or   • oxyCODONE (ROXICODONE) immediate release tablet 10 mg  10 mg Oral Q4H PRN Esvin Campbell MD       • prenatal vitamin tablet 1 tablet  1 tablet Oral Daily Esvin Campbell MD   1 tablet at 21 0953   • promethazine (PHENERGAN) tablet 25 mg  25 mg Oral Q6H PRN Esvin Campbell MD        Or   • promethazine (PHENERGAN) suppository 12.5 mg  12.5 mg Rectal Q6H PRN Esvin Campbell MD       • simethicone (MYLICON) chewable tablet 80 mg  80 mg Oral 4x Daily PRN Esvin Campbell MD   80 mg at 21 0535   • [] Sod Citrate-Citric Acid (BICITRA) 500-334 MG/5ML solution  - ADS Override Pull            • [COMPLETED] Sod Citrate-Citric Acid (BICITRA) solution 30 mL  30 mL Oral Once Esvin Campbell MD   30 mL at 21 0020   • sodium chloride 0.9 % flush 1-10 mL  1-10 mL Intravenous PRN Major Castaneda, DO       • sodium chloride 0.9 % flush 10 mL  10 mL Intravenous Q12H Major Castaneda, DO       • sodium chloride 0.9 % flush 3 mL  3 mL Intravenous  Q12H Esvin Campbell MD       • sodium chloride 0.9 % flush 3-10 mL  3-10 mL Intravenous PRN Esvin Campbell MD         Lab Results (last 7 days)     Procedure Component Value Units Date/Time    CBC & Differential [[id number]]  (Abnormal) Collected: 08/08/21 0544    Specimen: Blood Updated: 08/08/21 0658    Narrative:      The following orders were created for panel order CBC & Differential.  Procedure                               Abnormality         Status                     ---------                               -----------         ------                     CBC Auto Differential[[id number]]        Abnormal            Final result                 Please view results for these tests on the individual orders.    CBC Auto Differential [[id number]]  (Abnormal) Collected: 08/08/21 0544    Specimen: Blood Updated: 08/08/21 0658     WBC 7.23 10*3/mm3      RBC 3.76 10*6/mm3      Hemoglobin 11.3 g/dL      Hematocrit 35.0 %      MCV 93.1 fL      MCH 30.1 pg      MCHC 32.3 g/dL      RDW 12.8 %      RDW-SD 43.5 fl      MPV 10.7 fL      Platelets 198 10*3/mm3      Neutrophil % 59.5 %      Lymphocyte % 31.4 %      Monocyte % 7.3 %      Eosinophil % 0.6 %      Basophil % 0.4 %      Immature Grans % 0.8 %      Neutrophils, Absolute 4.30 10*3/mm3      Lymphocytes, Absolute 2.27 10*3/mm3      Monocytes, Absolute 0.53 10*3/mm3      Eosinophils, Absolute 0.04 10*3/mm3      Basophils, Absolute 0.03 10*3/mm3      Immature Grans, Absolute 0.06 10*3/mm3      nRBC 0.0 /100 WBC     Tissue Pathology Exam [[id number]] Collected: 08/07/21 0108    Specimen: Tissue from Placenta Updated: 08/07/21 0445    Blood Gas, Venous, Cord [[id number]]  (Abnormal) Collected: 08/07/21 0114    Specimen: Cord Blood Venous from Umbilical Cord Updated: 08/07/21 0114     Site Umbilical     pH, Cord Venous 7.318 pH Units      pCO2, Cord Venous 54.7 mm Hg      pO2, Cord Venous 14.1 mm Hg      HCO3, Cord Venous 28.0 mmol/L      Base Excess, Cord Venous 0.4 mmol/L       O2 Sat, Cord Venous 23.6 %      Hemoglobin, Blood Gas 17.0 g/dL      CO2 Content 29.7 mmol/L      Temperature 37.0 C      Barometric Pressure for Blood Gas --     Comment: N/A        Modality Room Air     FIO2 21 %      Rate 0 Breaths/minute      PIP 0 cmH2O      Comment: Meter: [id number]     :  [id number]        IPAP 0     EPAP 0     O2 Saturation Calculated --     Comment: Calculated O2 saturation result not reported at this site.        Note --     Collection Time --    Blood Gas, Arterial, Cord [[id number]]  (Abnormal) Collected: 08/07/21 0113    Specimen: Cord Blood Arterial from Umbilical Cord Updated: 08/07/21 0113     Site Umbilical     pH, Cord Arterial 7.28 pH Units      pCO2, Cord Arterial 62.5 mmHg      pO2, Cord Arterial --     Comment: 94 Value below reportable range < 13.0        HCO3, Cord Arterial 29.4 mmol/L      Base Exc, Cord Arterial 0.7 mmol/L      O2 Sat, Cord Arterial 9.3 %      Hemoglobin, Blood Gas 16.9 g/dL      CO2 Content 31.4 mmol/L      Temperature 37.0 C      Barometric Pressure for Blood Gas --     Comment: N/A        Modality Room Air     FIO2 21 %      Rate 0 Breaths/minute      PIP 0 cmH2O      Comment: Meter: [id number]     :  [id number]        IPAP 0     EPAP 0     Note --    Preeclampsia Panel [[id number]]  (Abnormal) Collected: 08/06/21 2245    Specimen: Blood Updated: 08/06/21 2342     Alkaline Phosphatase 134 U/L      ALT (SGPT) 19 U/L      AST (SGOT) 22 U/L      Creatinine 0.69 mg/dL      Total Bilirubin 0.2 mg/dL       U/L      Comment: Specimen hemolyzed.  Results may be affected.        Uric Acid 6.3 mg/dL     RPR [[id number]] Resulted: 02/18/21    Specimen: Blood Updated: 08/06/21 2337     External RPR Non-Reactive    CBC (No Diff) [[id number]]  (Normal) Collected: 08/06/21 2245    Specimen: Blood Updated: 08/06/21 2324     WBC 8.75 10*3/mm3      RBC 4.06 10*6/mm3      Hemoglobin 12.4 g/dL      Hematocrit 37.7 %      MCV 92.9 fL      MCH  30.5 pg      MCHC 32.9 g/dL      RDW 13.0 %      RDW-SD 43.9 fl      MPV 10.8 fL      Platelets 224 10*3/mm3     Creatinine Clearance - Urine, Clean Catch [[id number]] Collected: 08/04/21 1721    Specimen: 24 Hour Urine from Urine, Clean Catch Updated: 08/05/21 2344    Narrative:      The following orders were created for panel order Creatinine Clearance - Urine, Clean Catch.  Procedure                               Abnormality         Status                     ---------                               -----------         ------                     Creatinine clearance serum[[id number]]   Normal              Final result               Creatinine Clearance, Ur...[[id number]]                      Final result                 Please view results for these tests on the individual orders.    Creatinine Clearance, Urine, 24 Hour - Urine, Clean Catch [[id number]] Collected: 08/05/21 1943    Specimen: 24 Hour Urine from Urine, Clean Catch Updated: 08/05/21 2344     Creatinine Clearance 105.8 ml/min      Creatinine, Urine 26.7 mg/dL      Time (Hours) 24 hrs      Creatinine, 24H 1.04 g/24 hr      CREATININE CLEARANCE L/24 HOUR 152.4 L/24 hr      24H Urine Volume 3,900 mL     Urine Culture - Urine, Urine, Clean Catch [[id number]]  (Normal) Collected: 08/04/21 1746    Specimen: Urine, Clean Catch Updated: 08/05/21 2127     Urine Culture No growth    Protein, Urine, 24 Hour - Urine, Clean Catch [[id number]]  (Abnormal) Collected: 08/05/21 1943    Specimen: 24 Hour Urine from Urine, Clean Catch Updated: 08/05/21 2039     Protein, 24H Urine 222.3 mg/24hours     Narrative:      Reference ranges are based on a 24 hour period, interperet results accordingly.    Urinalysis With Culture If Indicated - Urine, Clean Catch [[id number]]  (Abnormal) Collected: 08/04/21 1746    Specimen: Urine, Clean Catch Updated: 08/04/21 1933     Color, UA Yellow     Appearance, UA Clear     pH, UA 5.5     Specific Gravity, UA 1.010     Glucose, UA Negative      Ketones, UA 15 mg/dL (1+)     Bilirubin, UA Negative     Blood, UA Negative     Protein, UA Negative     Leuk Esterase, UA Negative     Nitrite, UA Negative     Urobilinogen, UA 0.2 E.U./dL    Narrative:      Urine microscopic not indicated.    Varicella Zoster Antibody, IgG [[id number]] Resulted: 02/18/21    Specimen: Blood Updated: 08/04/21 1835     External Varicella Zoster IgG immune    HIV-1 Antibody, EIA [[id number]] Resulted: 02/18/21    Specimen: Blood Updated: 08/04/21 1835     External HIV Antibody Non-Reactive    Chlamydia trachomatis, Neisseria gonorrhoeae, PCR w/ confirmation - Swab, Vagina [[id number]] Resulted: 02/18/21    Specimen: Swab from Vagina Updated: 08/04/21 1833     External Chlamydia Screen NEG    Gonorrhea Screen - Swab, [[id number]] Resulted: 02/18/21    Specimen: Swab Updated: 08/04/21 1833     External Gonorrhea Screen NEG    Hepatitis C Antibody [[id number]] Resulted: 02/18/21    Specimen: Blood Updated: 08/04/21 1833     External Hepatitis C Ab Negative    Hepatitis B Surface Antigen [[id number]] Resulted: 02/18/21    Specimen: Blood Updated: 08/04/21 1833     External Hepatitis B Surface Ag Negative    Rubella Antibody, IgG [[id number]] Resulted: 02/18/21    Specimen: Blood Updated: 08/04/21 1833     External Rubella Qual Immune    Urine Drug Screen - Urine, Clean Catch [[id number]]  (Normal) Collected: 08/04/21 1746    Specimen: Urine, Clean Catch Updated: 08/04/21 1817     THC, Screen, Urine Negative     Phencyclidine (PCP), Urine Negative     Cocaine Screen, Urine Negative     Methamphetamine, Ur Negative     Opiate Screen Negative     Amphetamine Screen, Urine Negative     Benzodiazepine Screen, Urine Negative     Tricyclic Antidepressants Screen Negative     Methadone Screen, Urine Negative     Barbiturates Screen, Urine Negative     Oxycodone Screen, Urine Negative     Propoxyphene Screen Negative     Buprenorphine, Screen, Urine Negative    Narrative:      Cutoff For Drugs  Screened:    Amphetamines               500 ng/ml  Barbiturates               200 ng/ml  Benzodiazepines            150 ng/ml  Cocaine                    150 ng/ml  Methadone                  200 ng/ml  Opiates                    100 ng/ml  Phencyclidine               25 ng/ml  THC                            50 ng/ml  Methamphetamine            500 ng/ml  Tricyclic Antidepressants  300 ng/ml  Oxycodone                  100 ng/ml  Propoxyphene               300 ng/ml  Buprenorphine               10 ng/ml    The normal value for all drugs tested is negative. This report includes unconfirmed screening results, with the cutoff values listed, to be used for medical treatment purposes only.  Unconfirmed results must not be used for non-medical purposes such as employment or legal testing.  Clinical consideration should be applied to any drug of abuse test, particularly when unconfirmed results are used.      Preeclampsia Panel [[id number]]  (Abnormal) Collected: 08/04/21 1721    Specimen: Blood Updated: 08/04/21 1811     Alkaline Phosphatase 148 U/L      ALT (SGPT) 15 U/L      AST (SGOT) 23 U/L      Creatinine 0.60 mg/dL      Total Bilirubin 0.4 mg/dL       U/L      Uric Acid 6.8 mg/dL     Creatinine clearance serum [[id number]]  (Normal) Collected: 08/04/21 1721    Specimen: Blood Updated: 08/04/21 1811     Creatinine 0.60 mg/dL      eGFR Non African Amer 118 mL/min/1.73     Narrative:      GFR Normal >60  Chronic Kidney Disease <60  Kidney Failure <15      Fibrinogen [[id number]]  (Normal) Collected: 08/04/21 1721    Specimen: Blood Updated: 08/04/21 1808     Fibrinogen 466 mg/dL     COVID PRE-OP / PRE-PROCEDURE SCREENING ORDER (NO ISOLATION) - Swab, Nasopharynx [[id number]]  (Normal) Collected: 08/04/21 1722    Specimen: Swab from Nasopharynx Updated: 08/04/21 1806    Narrative:      The following orders were created for panel order COVID PRE-OP / PRE-PROCEDURE SCREENING ORDER (NO ISOLATION) - Swab,  Nasopharynx.  Procedure                               Abnormality         Status                     ---------                               -----------         ------                     COVID-19, ABBOTT IN-HOUS...[[id number]]  Normal              Final result                 Please view results for these tests on the individual orders.    COVID-19, ABBOTT IN-HOUSE,NASAL Swab (NO TRANSPORT MEDIA) 2 HR TAT - Swab, Nasopharynx [[id number]]  (Normal) Collected: 21 172    Specimen: Swab from Nasopharynx Updated: 21 180     COVID19 Presumptive Negative    Narrative:      Fact sheet for providers: https://www.fda.gov/media/438383/download     Fact sheet for patients: https://www.fda.gov/media/492758/download    Test performed by PCR.  If inconsistent with clinical signs and symptoms patient should be tested with different authorized molecular test.    CBC (No Diff) [[id number]]  (Normal) Collected: 21 172    Specimen: Blood Updated: 21 1753     WBC 7.39 10*3/mm3      RBC 4.51 10*6/mm3      Hemoglobin 13.7 g/dL      Hematocrit 39.8 %      MCV 88.2 fL      MCH 30.4 pg      MCHC 34.4 g/dL      RDW 12.7 %      RDW-SD 40.6 fl      MPV 11.0 fL      Platelets 239 10*3/mm3           Imaging Results (Last 7 Days)     Procedure Component Value Units Date/Time    [facility]  Diagnostic Center [[id number]] Collected: 21 1026     Updated: 21 1058    Narrative:      PAT NAME: ANITA ALY  [facility] REC#: [id number]  BIRTH DA: [id number]  PAT GEND: F  ACCOUNT#: [id number]  PAT TYPE: I  EXAM VINCENT: [id number]  REF PHYS DAVID ENRIQUEZ  ACCESSION [id number]      Patient Status  ============    Inpatient      Indication  ========    Severe preeclampsia. Previous c/s x 2. Succenturiate lobe, IAEDF      History  ======    General History  Height 167 cm  Height (ft) 5 ft  Height (in) 6 in  Other: BMI-30.5      Maternal Assessment  ==================    Height 167 cm  Height (ft) 5 ft  Height (in) 6  in      Method  =======    Transabdominal ultrasound examination      Pregnancy  =========    Desir pregnancy. Number of fetuses: 1      Dating  ======    Method of dating: based on stated JADE  GA by prior assessment 31 w + 5 d  JADE by prior assessment: 10/3/2021  Previous dating: based on stated JADE, selected on 08/5/2021  Agreed JADE of previous dating: 10/3/2021  Assigned: based on stated JADE, selected on 08/6/2021  Assigned GA 31 w + 5 d  Assigned JADE: 10/3/2021  Pregnancy length 280 d      General Evaluation  ================    Cardiac activity present.  bpm.  Fetal movements present.  Presentation cephalic.  Placenta Placental site: posterior with anterior component-succenturiate lobe placenta.  Amniotic fluid Amount of AF: normal. MVP 4.7 cm. MIRACLE 14.9 cm. Q1 3.4 cm, Q2 2.8 cm, Q3 4.1 cm, Q4 4.7 cm.      Fetal Anatomy  ============    Lips: Appear normal  Profile: Appears normal  Nose: Appears normal  4-chamber view: Normal  RVOT view: Appears normal  LVOT view: Appears normal  Heart / Thorax  4-chamber view: patent foramen ovale  Stomach: Normal  Kidneys: Normal  Bladder: Normal  Gender: male  Wants to know gender: yes      Biophysical Profile  ===============    2: Fetal breathing movements  2: Gross body movements  2: Fetal tone  2: Amniotic fluid volume  8/8 Biophysical profile score      Fetal Doppler  ===========    Arterial  Umbilical A PI 1.36  98%        [place]    Umbilical A RI 0.84  >99%        [place]    Umbilical A PS 30.87 cm/s  <1%        [place]    Umbilical A ED 5.02 cm/s  Umbilical A TAmax 18.99 cm/s  <1%        [place]    Umbilical A MD 5.02 cm/s  Umbilical A S / D 6.15  >99%        [place]    Umbilical A  bpm  Impression: IAEDF      Consultation / Office Visit  ====================    Inpatient  See Epic      Impression  =========    Cephalic  Normal MIRACLE  IAEDF  BPP 8/8      Coding  ======    Description: 58724-12 BPP without NST  Description: 35658-03 Doppler Umbilical  Artery        Sonographer: RT MARY Cuellar , [facility]  Physician: Viviane Rivera MD, FACOG    Electronically signed by: Viviane Rivera MD, FACOG at:  10:58    Critical access hospital  Diagnostic Center [[id number]] Collected: 21     Updated: 21 1420    Narrative:      PAT NAME: ANITA ALY  MED REC#: [id number]  BIRTH DA: [id number]  PAT GEND: F  ACCOUNT#: [id number]  PAT TYPE: I  EXAM VINCENT: [id number]  REF PHYS DAVID ENRIQUEZ  ACCESSION [id number]      Patient Status  ============    Inpatient      Indication  ========    Severe preeclampsia. Previous c/s x 2.      History  ======    General History  Height 167 cm  Height (ft) 5 ft  Height (in) 6 in  Other: BMI-30.5      Maternal Assessment  ==================    Height 167 cm  Height (ft) 5 ft  Height (in) 6 in  Weight 88 kg  Weight (lb) 193 lb  BMI 31.39 kg/m²      Method  =======    Transabdominal ultrasound examination. View: Adequate view      Pregnancy  =========    Desri pregnancy. Number of fetuses: 1      Dating  ======    Method of dating: based on stated JADE  GA by prior assessment 31 w + 4 d  JADE by prior assessment: 10/3/2021  Ultrasound examination on: 2021  GA by U/S based upon: AC, BPD, Femur, HC  GA by U/S 29 w + 4 d  JADE by U/S: 10/17/2021  Previous dating: based on stated JADE, selected on 2021  Agreed JADE of previous dating: 10/3/2021  Assigned: based on stated JADE, selected on 2021  Assigned GA 31 w + 4 d  Assigned JADE: 10/3/2021  Pregnancy length 280 d      Fetal Biometry  ============    Standard  BPD 77.8 mm  31w 2d        29%        Hadlock    .0 mm  30w 6d        6%        Hadlock    .6 mm  28w 0d        <1%        Hadlock    Femur 52.3 mm  27w 6d        <1%        Hadlock    Humerus 50.4 mm  29w 4d        6%        [place]acon    HC / AC 1.19    EFW 1,232 g          11%        [place]    EFW (lb) 2 lb  EFW (oz) 11 oz  EFW by: Hadlock (BPD-HC-AC-FL)  Extended   7.7 mm  CM 5.6 mm           11%        Nicolaides    Extremities / Bony Struc  FL / BPD 0.67    FL / HC 0.19    FL / AC 0.22    Other Structures   bpm      General Evaluation  ================    Cardiac activity present.  bpm.  Fetal movements present.  Presentation cephalic.  Placenta Placental site: posterior with anterior succenturiate lobe.  Umbilical cord Cord vessels: 3 vessel cord.  Amniotic fluid Amount of AF: normal. MVP 3.9 cm. MIRACLE 11.9 cm. Q1 2.4 cm, Q2 2.3 cm, Q3 3.9 cm, Q4 3.3 cm.      Fetal Anatomy  ============    Cranium: Normal  Cavum septi pellucidi: Normal  Cerebellum: Normal  Cisterna magna: Normal  Head / Neck  Rt lateral ventricle: Normal  Lt lateral ventricle: Normal  Lips: suboptimal  Profile: suboptimal  Nose: suboptimal  4-chamber view: Appears normal  RVOT view: Normal  LVOT view: Normal  Heart / Thorax  3-vessel view: Normal  3-vessel-trachea view: normal  Cord insertion: Normal  Stomach: Appears normal  Kidneys: Appears normal  Bladder: Appears normal  Gender: male  Wants to know gender: yes      Fetal Doppler  ===========    Arterial  Umbilical artery: abnormal  Umbilical A details: Intermittent absent end diastolic flow  Umbilical A sampling site: midcord  Umbilical A PI 1.42          >99%        [place]    Umbilical A RI 0.79          98%        [place]    Umbilical A PS 28.05 cm/s          <1%        [place]    Umbilical A ED 5.63 cm/s  Umbilical A TAmax 15.41 cm/s          <1%        [place]bbing    Umbilical A MD 5.50 cm/s  Umbilical A S / D 4.82          >99%        [place]    Umbilical A  bpm      Biophysical Profile  ===============    0: Fetal breathing movements  2: Gross body movements  2: Fetal tone  2: Amniotic fluid volume  6/8 Biophysical profile score      Consultation / Office Visit  ====================    Inpatient  See Epic      Impression  =========    Cephalic  FGR noted (overall 11th%ile, AC < 1st%ile)  Normal fluid  Placenta posterior with an anterior succenturiate  lobe  Normal appearing but limited anatomic survey  UA doppler elevated with IAEDF  BPP 6/8 (-2 breathing)      Coding  ======    Description: 41892-65 Follow Up Ultrasound  Description: 09131-84 BPP without NST  Description: 81752-86 Doppler Umbilical Artery        Sonographer: Linda Lee RDMS  Physician: Viviane Rivera MD, FACOG    Electronically signed by: Viviane Rivera MD, FACOG at:  14:20        Orders (last 7 days)      Start     Ordered    21 1412  COVID PRE-OP / PRE-PROCEDURE SCREENING ORDER (NO ISOLATION) - Swab, Nasopharynx  Once      21 1413    21 1412  COVID-19, ABBOTT IN-HOUSE,NASAL Swab (NO TRANSPORT MEDIA) 2 HR TAT - Swab, Nasopharynx  PROCEDURE ONCE      21 1413    21 1100  NIFEdipine XL (PROCARDIA XL) 24 hr tablet 30 mg  Every 12 Hours Scheduled      21 1014    21 0800  [facility]  1 Time Imaging,   Status:  Canceled      21 1231    21 1815  NIFEdipine (PROCARDIA) capsule 10 mg  Once      21 1719    21 1400  labetalol (NORMODYNE) tablet 300 mg  Every 8 Hours Scheduled      21 0946    21 1350  DIET MESSAGE Please send two blue gatorades Thanks  Once     Comments: Please send two blue gatorades   Thanks    21 1350    21 1045  NIFEdipine (PROCARDIA) capsule 10 mg  Once      21 0945    21 0946  Repeat B/P within 30min- 1 hr and call if >150/90  Nursing Communication  Once     Comments: Repeat B/P within 30min- 1 hr and call if >150/90    21 0946    21 0900  ibuprofen (ADVIL,MOTRIN) tablet 600 mg  Every 6 Hours      21 0401    21 0700  ibuprofen (ADVIL,MOTRIN) tablet 600 mg  Every 6 Hours,   Status:  Discontinued      21 0336    21 0600  Discontinue Indwelling Urinary Catheter in AM  Once      21 0336    21 0600  CBC & Differential  Timed      21 0336    21 0600  acetaminophen (TYLENOL) tablet 650  mg  Every 6 Hours      08/07/21 0401    08/08/21 0600  CBC Auto Differential  PROCEDURE ONCE      08/07/21 2206    08/08/21 0400  acetaminophen (TYLENOL) tablet 650 mg  Every 6 Hours,   Status:  Discontinued      08/07/21 0336    08/08/21 0000  Remove Abdominal Dressing  Once      08/07/21 0336    08/07/21 0900  sodium chloride 0.9 % flush 3 mL  Every 12 Hours Scheduled      08/07/21 0336    08/07/21 0900  prenatal vitamin tablet 1 tablet  Daily      08/07/21 0336    08/07/21 0900  ketorolac (TORADOL) injection 15 mg  Every 6 Hours      08/07/21 0401    08/07/21 0800  Ambulate Patient  2 Times Daily     Comments: After anesthesia wears off.    08/07/21 0336    08/07/21 0700  ketorolac (TORADOL) injection 15 mg  Every 6 Hours,   Status:  Discontinued      08/07/21 0336    08/07/21 0600  acetaminophen (TYLENOL) tablet 1,000 mg  Every 6 Hours      08/07/21 0401    08/07/21 0400  Incentive spirometry RT  Every Hour      08/07/21 0336    08/07/21 0400  acetaminophen (TYLENOL) tablet 1,000 mg  Every 6 Hours,   Status:  Discontinued      08/07/21 0336    08/07/21 0337  Code Status and Medical Interventions:  Continuous      08/07/21 0336    08/07/21 0337  Vital Signs Per hospital policy  Per Hospital Policy      08/07/21 0336    08/07/21 0337  Notify Physician  Until Discontinued      08/07/21 0336    08/07/21 0337  Patient May Shower  Once     Comments: After anesthesia wears off and with assistance    08/07/21 0336    08/07/21 0337  Diet Regular  Diet Effective Now      08/07/21 0336    08/07/21 0337  Advance Diet as Tolerated  Until Discontinued      08/07/21 0336    08/07/21 0337  I/O  Every Shift      08/07/21 0336    08/07/21 0337  Fundal and Lochia Check  Per Hospital Policy     Comments: Q 30 min x 2, Q 1 hr x 4, Q 4 hrs x 24 hrs, then Q shift.    08/07/21 0336    08/07/21 0337  Continue Indwelling Urinary Catheter Already in Place  Once      08/07/21 0336    08/07/21 0337  Notify Provider if Bladder Distention  "Continues  Until Discontinued      08/07/21 0336    08/07/21 0337  Urinary Catheter Care  Every Shift,   Status:  Canceled      08/07/21 0336    08/07/21 0337  Turn Cough Deep Breathe  Once      08/07/21 0336    08/07/21 0337  Encourage early intake of PO fluids  Continuous      08/07/21 0336    08/07/21 0337  Ambulate Patient 3-5 times per day (with or without Zelaya)  Every Shift      08/07/21 0336    08/07/21 0337  Apply Abdominal Binding Until Discontinued  Until Discontinued      08/07/21 0336    08/07/21 0337  \"If patient tolerates food and liquids after completion of second bag of Pitocin, saline lock IV and discontinue IV fluid infusions.  Once      08/07/21 0336    08/07/21 0337  Breast pump to bed  Once      08/07/21 0336    08/07/21 0337  If indicated -- Please administer RH Immunoglobulin based on results of cord blood evaluation and fetal screen lab tests, pharmacy to dispense  Per Order Details     Comments: See Process Instructions For Reference Range Details.    08/07/21 0336    08/07/21 0337  Insert Peripheral IV  Once      08/07/21 0336    08/07/21 0337  Saline Lock & Maintain IV Access  Continuous      08/07/21 0336    08/07/21 0337  Place Sequential Compression Device  Once      08/07/21 0336    08/07/21 0337  Maintain Sequential Compression Device  Continuous      08/07/21 0336    08/07/21 0336  promethazine (PHENERGAN) tablet 25 mg  Every 6 Hours PRN      08/07/21 0336    08/07/21 0336  promethazine (PHENERGAN) suppository 12.5 mg  Every 6 Hours PRN      08/07/21 0336    08/07/21 0336  aluminum-magnesium hydroxide-simethicone (MAALOX MAX) 400-400-40 MG/5ML suspension 15 mL  Every 4 Hours PRN      08/07/21 0336    08/07/21 0336  calcium carbonate (TUMS) chewable tablet 500 mg (200 mg elemental)  Every 4 Hours PRN      08/07/21 0336    08/07/21 0336  oxyCODONE (ROXICODONE) immediate release tablet 5 mg  Every 4 Hours PRN      08/07/21 0336    08/07/21 0336  oxyCODONE (ROXICODONE) immediate release " tablet 10 mg  Every 4 Hours PRN      08/07/21 0336    08/07/21 0336  morphine injection 2 mg  Every 4 Hours PRN      08/07/21 0336    08/07/21 0336  naloxone (NARCAN) injection 0.4 mg  Every 5 Minutes PRN      08/07/21 0336    08/07/21 0336  docusate sodium (COLACE) capsule 100 mg  2 Times Daily PRN      08/07/21 0336    08/07/21 0336  simethicone (MYLICON) chewable tablet 80 mg  4 Times Daily PRN      08/07/21 0336    08/07/21 0336  lanolin cream  Every 1 Hour PRN      08/07/21 0336    08/07/21 0336  Hydrocortisone (Perianal) (ANUSOL-HC) 2.5 % rectal cream 1 application  As Needed      08/07/21 0336    08/07/21 0336  sodium chloride 0.9 % flush 3-10 mL  As Needed      08/07/21 0336    08/07/21 0245  oxytocin in sodium chloride (PITOCIN) 30 UNIT/500ML infusion solution  Once,   Status:  Discontinued      08/07/21 0148    08/07/21 0245  ketorolac (TORADOL) injection 30 mg  Once      08/07/21 0148    08/07/21 0245  diphenhydrAMINE (BENADRYL) injection 12.5 mg  Once      08/07/21 0159    08/07/21 0152  diphenhydrAMINE (BENADRYL) injection 25 mg  Every 6 Hours PRN      08/07/21 0153    08/07/21 0149  Notify Provider (Specified)  Until Discontinued      08/07/21 0148    08/07/21 0149  Vital Signs Per Hospital Policy  Per Hospital Policy      08/07/21 0148    08/07/21 0149  Strict Bed Rest  Until Discontinued      08/07/21 0148    08/07/21 0149  Fundal & Lochia Check  Per Order Details     Comments: Every 15 Minutes x4, Then Every 30 Minutes x2, Then Every Shift    08/07/21 0148    08/07/21 0149  Diet Regular  Diet Effective Now,   Status:  Canceled      08/07/21 0148    08/07/21 0148  Fundal & Lochia Check  Every Shift      08/07/21 0148    08/07/21 0148  methylergonovine (METHERGINE) injection 200 mcg  Once As Needed,   Status:  Discontinued      08/07/21 0148    08/07/21 0148  carboprost (HEMABATE) injection 250 mcg  Every 15 Minutes PRN,   Status:  Discontinued      08/07/21 0148 08/07/21 0148  miSOPROStol  (CYTOTEC) tablet 800 mcg  Once As Needed,   Status:  Discontinued      08/07/21 0148    08/07/21 0148  oxytocin in sodium chloride (PITOCIN) 30 UNIT/500ML infusion solution  Once,   Status:  Discontinued      08/07/21 0148    08/07/21 0115  Blood Gas, Venous, Cord  Once      08/07/21 0114    08/07/21 0114  Blood Gas, Arterial, Cord  Once      08/07/21 0113    08/07/21 0100  sodium chloride 0.9 % flush 10 mL  Every 12 Hours Scheduled,   Status:  Discontinued      08/07/21 0003    08/07/21 0100  lactated ringers bolus 1,000 mL  Once      08/07/21 0003    08/07/21 0100  lactated ringers infusion  Continuous      08/07/21 0003    08/07/21 0100  Sod Citrate-Citric Acid (BICITRA) solution 30 mL  Once      08/07/21 0003    08/07/21 0100  acetaminophen (TYLENOL) tablet 1,000 mg  Once,   Status:  Discontinued      08/07/21 0003    08/07/21 0100  ceFAZolin in dextrose (ANCEF) IVPB solution 2 g  Once,   Status:  Discontinued      08/07/21 0003    08/07/21 0039  Tissue Pathology Exam  Once      08/07/21 0038    08/07/21 0015  magnesium sulfate 20 GM/500ML infusion  Continuous      08/06/21 2320    08/07/21 0004  Vital Signs Per Hospital Policy  Per Hospital Policy      08/07/21 0003    08/07/21 0004  Continuous Fetal Monitoring With NST on Admission and Prior to Initiation of Oxytocin.  Per Order Details     Comments: Continuous Fetal Monitoring With NST on Admission & Prior to Initiation of Oxytocin.    08/07/21 0003    08/07/21 0004  External Uterine Contraction Monitoring  Per Hospital Policy      08/07/21 0003    08/07/21 0004  Notify Provider (Specified)  Until Discontinued      08/07/21 0003    08/07/21 0004  Notify Provider of Tachysystole (Per Hospital Algorithm)  Until Discontinued      08/07/21 0003    08/07/21 0004  Notify Provider if Membranes Ruptured, Bleeding Greater Than 1 Pad Per Hour, Fetal Heart Tone Abnormality or Severe Pain  Until Discontinued      08/07/21 0003    08/07/21 0004  Initiate Group Beta Strep  (GBS) Prophylaxis Protocol, If Criteria Met  Continuous     Comments: NO TREATMENT RECOMMENDED IF: 1) Maternal GBS Status Known Negative 2) Scheduled  Birth With Intact Membranes, Not in Labor 3) Maternal GBS Status Unknown, No Risk Factors  TREAT WITH ANTIBIOTICS IF:  1) Maternal GBS Status Known Positive 2) Maternal GBS Status Unknown With Risk Factors: a)  Previous Infant Affected By GBS Infection b) GBS Urinary Tract Infection (UTI) or Bacteriuria During Pregnancy c) Unexplained Maternal Fever (100.4F (38C) or Greater) During Labor d)  Prolonged Rupture of Membranes (18 or More Hours) e) Gestational Age Less Than 37 Weeks    21 0003    21 0004  Insert Indwelling Urinary Catheter  Once,   Status:  Canceled      21 0003    21 0004  Assess Need for Indwelling Urinary Catheter - Follow Removal Protocol  Continuous,   Status:  Canceled     Comments: Indwelling Urinary Catheter Removal Criteria  Discontinue Indwelling Urinary Catheter Unless One of the Following is Present:  Urinary Retention or Obstruction  Chronic Urinary Catheter Use  End of Life  Critical Illness with Strict I/O   Tract or Abdominal Surgery  Stage 3/4 Sacral / Perineal Wound  Required Activity Restriction: Trauma  Required Activity Restriction: Spine Surgery  If Patient is Being Followed by Urology Contact Them PRIOR to Removal  Do Not Remove Indwelling Urinary Catheter Order is Present with a CLINICAL REASON to Maintain the Catheter. Provider is Required to Include a Clinical Reason to Maintain a Urinary Catheter    Patient Admitted With Indwelling Urinary Catheter (Not Placed at [facility])  Assess for Continued Need & Document Medical Necessity  If Infection is Suspected, Contact the Provider        21 0003    21 0004  Abdominal Prep With Clippers  Once      21 0003    21 0004  Chlorhexadine Skin Prep Unless Otherwise Indicated  Once      21 0003    21 0004  SCD  (Sequential Compression Devices)  Once      21 0003    21 0004  POC Glucose Once  Once      21 0003    21 0004  Document Gatorade Consumption Prior to Admission (Yes or No)  Once      21 0003    21 0004  NPO Diet  Diet Effective Now,   Status:  Canceled      21 0003    21 0004  Insert Peripheral IV  Once      21 0003    21 0004  Saline Lock & Maintain IV Access  Continuous,   Status:  Canceled      21 0003    21 0003  Urinary Catheter Care  Every Shift,   Status:  Canceled      21 0003    21 0003  lidocaine PF 1% (XYLOCAINE) injection 5 mL  As Needed,   Status:  Discontinued      21 0003    21 0003  sodium chloride 0.9 % flush 10 mL  As Needed,   Status:  Discontinued      21 0003    21 2344  Sod Citrate-Citric Acid (BICITRA) 500-334 MG/5ML solution  - ADS Override Pull     Note to Pharmacy: Created by cabinet override    21 2344    21 2344  ceFAZolin in dextrose (ANCEF) 2-4 GM/100ML-% IVPB solution  - ADS Override Pull     Note to Pharmacy: Created by cabinet override    21 2344    21 2320  magnesium sulfate 20 GM/500ML infusion  - ADS Override Pull     Note to Pharmacy: Created by cabinet override    21 2320    21 2250  Preeclampsia Panel  STAT      21 2249    21 2249  CBC (No Diff)  STAT      21 2249    21 1730  docusate sodium (COLACE) capsule 100 mg  2 Times Daily      21 1652    21 1300  Fetal Monitoring  3 Times Daily     Comments: X 1 hour    21 1048    21 1048  Diet Regular  Diet Effective Now,   Status:  Canceled      21 1047    21 1047  Fetal Monitoring  Once,   Status:  Canceled      21 1046    21 0001  NPO Diet  Diet Effective Midnight,   Status:  Canceled      21 1633    21 0000  Critical access hospital  Diagnostic Center  1 Time Imaging      21 1313    21 0000  RPR      Comments: This is an external result entered through the Results Console.      21 2337    21 2245  famotidine (PEPCID) tablet 20 mg  Once      21 2157    21 1300  betamethasone acetate-betamethasone sodium phosphate (CELESTONE SOLUSPAN) injection 12 mg  Once      21 1708    21 0900  Inpatient Consult to Neonatology  Once     Specialty:  Neonatology  Provider:  (Not yet assigned)    21 1723    21 0827  Diet Regular  Diet Effective Now,   Status:  Canceled      21 0821 0800  [facility]  1 Time Imaging      21 1720    21 0800  Inpatient Maternal & Fetal Medicine Consult  Once     Specialty:  Maternal and Fetal Medicine  Provider:  (Not yet assigned)    21 17221 0245  famotidine (PEPCID) tablet 20 mg  Once      21 0148    21 0001  NPO Diet  Diet Effective Midnight,   Status:  Canceled      21 2328  diphenhydrAMINE (BENADRYL) capsule 25 mg  Nightly PRN      21 2329    21 2100  sodium chloride 0.9 % flush 10 mL  Every 12 Hours Scheduled      21 204  acetaminophen (TYLENOL) tablet 1,000 mg  Every 6 Hours PRN      21 20421  Diet Regular  Diet Effective Now,   Status:  Canceled      21 193  Urine Culture - Urine, Urine, Clean Catch  Once      21 1933    08/04/21 1800  labetalol (NORMODYNE) tablet 200 mg  Once,   Status:  Discontinued      21 1702    21 1800  dextrose 5 % and sodium chloride 0.2 % infusion  Continuous      21 17021 1800  magnesium sulfate 20 GM/500ML infusion  Continuous,   Status:  Discontinued      21 17021 1722  POC Protein, Urine, Qualitative, Dipstick  Once      21 1721    21 1722  Urinalysis With Culture If Indicated - Urine, Clean Catch  Once      21 1721    21 1722  Urine Drug Screen - Urine,  Clean Catch  Once      08/04/21 1721    08/04/21 1722  labetalol (NORMODYNE) tablet 200 mg  Every 8 Hours Scheduled,   Status:  Discontinued      08/04/21 1722 08/04/21 1709  COVID PRE-OP / PRE-PROCEDURE SCREENING ORDER (NO ISOLATION) - Swab, Nasopharynx  Once      08/04/21 1708 08/04/21 1709  COVID-19, ABBOTT IN-HOUSE,NASAL Swab (NO TRANSPORT MEDIA) 2 HR TAT - Swab, Nasopharynx  PROCEDURE ONCE      08/04/21 1708 08/04/21 1708  Fibrinogen  STAT      08/04/21 1708 08/04/21 1707  ondansetron (ZOFRAN) tablet 4 mg  Every 8 Hours PRN      08/04/21 1708 08/04/21 1707  ondansetron (ZOFRAN) injection 4 mg  Every 8 Hours PRN      08/04/21 1708 08/04/21 1707  Intermittent Auscultation FOR LOW RISK PATIENTS - NST on Admission Along With Intermittent Auscultation of Fetal Heart Tones.  Per Order Details     Comments: Intermittent Auscultation FOR LOW RISK PATIENTS - NST on Admission Along With Intermittent Auscultation of Fetal Heart Tones.    08/04/21 1708 08/04/21 1707  For Antepartum Patients Greater Than 24 Weeks - NST Daily and Monitor Fetal Heart Tones for One Hour 3 Times Daily and PRN.  Per Order Details,   Status:  Canceled     Comments: For Antepartum Patients Greater Than 24 Weeks - NST Daily & Monitor Fetal Heart Tones For One Hour 3 Times Daily & PRN.    08/04/21 1708 08/04/21 1707  For Antepartum Patients Less Than 24 Weeks - Document Fetal Heart Tones Daily and PRN.  Per Order Details,   Status:  Canceled     Comments: For Antepartum Patients Less Than 24 Weeks - Document Fetal Heart Tones Daily & PRN.    08/04/21 1708 08/04/21 1707  Notify Physician (specified)  Until Discontinued      08/04/21 1708 08/04/21 1707  Notify physician for hyperstimulus (per hospital algorithm)  Until Discontinued      08/04/21 1708 08/04/21 1707  Notify physician if membranes ruptured, bleeding greater than 1 pad an hour, fetal heart tone abnormality, and severe pain  Until Discontinued      08/04/21  21  Initiate Group Beta Strep (GBS) Prophylaxis Protocol, If Criteria Met  Continuous     Comments: NO TREATMENT RECOMMENDED IF: 1)  Maternal GBS status known negative 2)  Scheduled  birth with intact membranes, not in labor.  3 ) Maternal GBS unknown, no risk factors.   TREAT WITH ANTIBIOTICS IF:  1)  Maternal GBS status is known postive.  2)  Maternal GBS status unknown with these risk factors:  a)  Previous infant affected by GBS infection.  b)  GBS urinary tract infection (UTI) or bacteruria during pregnancy  c)  Unexplained maternal fever in labor (greater than or equal to 100.4F or 38.0C)  d)  Prolonged rupture of the membranes greater than or equal to 18 hours.  e)  Gestational age less than 37 weeks.    21  CBC (No Diff)  Once      21  Type & Screen  Once      21  Preeclampsia Panel  Once      21  Insert Peripheral IV  Once      21 170  Saline Lock & Maintain IV Access  Continuous,   Status:  Canceled      21 170  Place Sequential Compression Device  Once      21  Maintain Sequential Compression Device  Continuous      21  NPO Diet  Diet Effective Now,   Status:  Canceled      21 170  Protein, Urine, 24 Hour - Urine, Clean Catch  Once      21 170  Creatinine Clearance - Urine, Clean Catch  Once      21 170  Creatinine clearance serum  PROCEDURE ONCE      21 170  Creatinine Clearance, Urine, 24 Hour - Urine, Clean Catch  PROCEDURE ONCE      21 170  lidocaine PF 1% (XYLOCAINE) injection 5 mL  As Needed      21 170  sodium chloride 0.9 % flush 1-10 mL  As Needed      218    21 170  Admit To Obstetrics Inpatient   Once      08/04/21 1708    08/04/21 1706  Code Status and Medical Interventions:  Continuous,   Status:  Canceled      08/04/21 1708    08/04/21 1706  Vital Signs Per hospital policy  Per Hospital Policy      08/04/21 1708 08/04/21 1704  labetalol (NORMODYNE,TRANDATE) 5 MG/ML injection  - ADS Override Pull     Note to Pharmacy: Created by cabinet override    08/04/21 1704 08/04/21 1701  labetalol (NORMODYNE,TRANDATE) injection 20-80 mg  Every 10 Minutes PRN      08/04/21 1702 08/04/21 0000  Chlamydia trachomatis, Neisseria gonorrhoeae, PCR w/ confirmation - Swab, Vagina     Comments: This is an external result entered through the Results Console.      08/04/21 1833 08/04/21 0000  Gonorrhea Screen - Swab,     Comments: This is an external result entered through the Results Console.      08/04/21 1833 08/04/21 0000  Hepatitis C Antibody     Comments: This is an external result entered through the Results Console.      08/04/21 1833 08/04/21 0000  Hepatitis B Surface Antigen     Comments: This is an external result entered through the Results Console.      08/04/21 1833 08/04/21 0000  Rubella Antibody, IgG     Comments: This is an external result entered through the Results Console.      08/04/21 1833 08/04/21 0000  HIV-1 Antibody, EIA     Comments: This is an external result entered through the Results Console.      08/04/21 1835 08/04/21 0000  Varicella Zoster Antibody, IgG     Comments: This is an external result entered through the Results Console.      08/04/21 1835    Unscheduled  Position Change - For Intra-Uterine Resusitation for Hypertonus, HyperStimulation or Non-Reassuring Fetal Status  As Needed      08/04/21 1708    Unscheduled  Up with Assistance  As Needed      08/07/21 0336    Unscheduled  Bladder Scan if Patient Unable to Void 4-6 Hours After Catheter Removal  As Needed      08/07/21 0336    Unscheduled  Straight Cath Every 4-6 Hours As Needed If Patient is Unable to Void  After 4-6 Hours, Bladder Scan Volume is Greater Than 500mL & Patient Has Symptoms of Bladder Discomfort / Distention  As Needed      21 033    Unscheduled  Consult Pharmacist For Review of Medications That May Cause Urinary Retention - RN To Place Order for Consult it Needed  As Needed      21 033    Unscheduled  Schedule / Prompt Voiding For Patients With Urinary Incontinence  As Needed      21 033    Unscheduled  Abdominal Wound Care  As Needed     Comments: Postop day 1. Remove dressing and leave incision open to air.    21 033    Unscheduled  KPad  As Needed     Comments: PRN pain    21 033    Unscheduled  Apply Ice Pack to Perineum  As Needed     Comments: For 20 min q 2hrs    21 033    Unscheduled  Apply Waffle Cushion  As Needed     Comments: PRN perineal discomfort    21    Unscheduled  Chewing Gum  As Needed      21    Unscheduled  Warm compress  As Needed      21    Unscheduled  Apply ace wrap, tight bra, or binder  As Needed      21 033    Unscheduled  Apply ice packs  As Needed      21 033    --  citalopram (CeleXA) 20 MG tablet  Daily      21 1831                   Operative/Procedure Notes (last 7 days) (Notes from 21 1557 through 21 1557)      Esvin Campbell MD at 21 0137          [facility]  Gregoria Miguel  : 1990  MRN: [id number]  CSN: [id number]     Referring Provider: Lawrence Ford         Section Operative Note    Pre-Operative Dx: 1.   Intrauterine pregnancy at 31w6d weeks 2.   Non-reassuring fetal status  3. IUGR   4. AEDF      Postoperative dx:  1.   Intrauterine pregnancy at 31w6d weeks 2.   Same           Procedure: Repeat  (LTCS)       Surgeon: Esvin Campbell MD   Assistant: Chen Mcgrath       Anesthesia: Spinal        EBL: 350 mls.   IV Fluids: 1400 mls.   UOP: 800 mls.     Antibiotics: cefazolin 2 gms     Infant:      Name:  Shukri      Gender:  male  infant    Weight: 1260 g (2 lb 12.4 oz)     Apgars: 4  @ 1 minute / 8  @ 5 minutes     Procedure Details:   After the patient was adequately anesthetized, she was sterilely prepped and draped in the dorsal supine left lateral tilt position. A Pfannenstiel incision was created sharply with the knife. It was carried down to the fascia with the Bovie.  The fascia was cut transversely with the knife and extended in a curvilinear fashion with scissors.   This had some element of difficulty due to increased scar tissue. The fascia was freed from its midline insertion superiorly and inferiorly. Rectus muscles were  in the midline. The peritoneum was sharply entered and a bladder flap was not sharply created. The lower uterine segment was scored transversely with the knife. Clear amniotic fluid was seen. The infant's head was delivered atraumatically. The mouth and nose were bulb suctioned. The cord was quickly milked 4 times prior to being clamped and cut. The infant was handed to the delivery team which was in attendance. The placenta ws challenging to remove requiring manual shearing to be extracted. The uterus was thoroughly checked for placenta fragments and after good visualization the uterus was clear of products of conception.  The uterus was exteriorized and wiped free of debris and clot. The uterine incision was closed with #1 Monocryl in a continuous running locking fashion. A second #1 Monocryl was used to imbricate across the first.  A third #1 Monocryl was used to close the serosa due to the thickened lower segment    The uterus was returned to the abdomen. The paracolic gutters were cleared of debris and clot.  The lower area above the bladder was raw and although had some improvement in oozing blood, required surgicel snow for hemostasis.  The fascia was closed with 0 PDS. The subcutaneous tissue was copiously irrigated. Subcutaneous tissue was reapproximated with 3-0 Plain Gut and the skin  "was closed with Insorb staples subcuticularly. An Exofin dressing was applied followed by a pressure dressing due to the increased oozing of blood.  All counts were correct.         Complications:   None       Disposition:   Mother to Mother Baby/Postpartum  in stable condition currently.   Baby to NICU  in stable condition currently.       Esvin Campbell MD  2021  01:38 EDT          Electronically signed by Esvin Campbell MD at 21 0146          Physician Progress Notes (last 7 days) (Notes from 21 1557 through 21 1557)      Oracio De La Rosa III, MD at 21 1506            Laborist Post Partum Note: Postpartum Day: #2     Patient Name:  Gregoria Miguel  :  1990  MRN:  [id number]  Referring Physician: Lawrence Ford     Chief complaint:   Chief Complaint   Patient presents with   • Elevated Blood Pressure        S.  Patient denies any headaches since receiving epidural blood patch.  Also denies scotomata or epigastric pain.    O.     Vitals:    21 1030   BP: 135/84   Pulse: 65   Resp:    Temp:    SpO2:        Examination              Chest: Clear to auscultation.  Normal air movement.              Heart: Regular rate and rhythm without murmurs, gallops or rubs.              Abdomen: Incision clean, dry, and intact with subcuticular suture in place   Extremities: No calf tenderness.       A.    Postop day #2 status post  section for gestational hypertension.  Continues to have intermittent blood pressure elevation.      P.  Continue with labetalol 300 mg orally every 8 hours.  Will rely on as needed nifedipine for control of severe range hypertension.  If frequent blood pressure elevation noted will proceed with scheduled nifedipine XL.    Oracio Luu \"Maritza\" TREVON De La Rosa MD  15:06 EDT  21    Electronically signed by Oracio De La Rosa III, MD at 21 1508     Oracio De La Rosa III, MD at 21 1051            Laborist Post Partum Note: Postpartum Day: #1 " "    Patient Name:  Gregoria Miguel  :  1990  MRN:  [id number]  Referring Physician: Lawrence Ford     Chief complaint:   Chief Complaint   Patient presents with   • Elevated Blood Pressure        S.  Patient denies headache, scotomata or epigastric pain.  Resting comfortably.    O.     Vitals:    21 0941   BP: 169/91   Pulse: 66   Resp: 18   Temp: 98.7 °F (37.1 °C)   SpO2:        Examination              Chest: Clear to auscultation.  Normal air movement.              Heart: Regular rate and rhythm without murmurs, gallops or rubs.              Abdomen: Incision clean, dry, and intact with subcuticular suture in place.  Pressure dressing removed.   Extremities: No calf tenderness.       A.    Postop day #1 status post  section secondary to gestational hypertension.  Call earlier this morning secondary to a severe range blood pressure.  Treated with a one-time dose of nifedipine 10 mg with normotensive readings subsequently.  Labetalol increased to 300 mg every 8 hours.      P.  Continue to follow blood pressure.  If repeat severe range pressure, will add nifedipine.    Oracio Luu \"Maritza\" TREVON De La Rosa MD  10:52 EDT  21    Electronically signed by Oracio De La Rosa III, MD at 21 1053     Esvin Campbell MD at 21 2306        Labourist:      Ms. Miguel has had another prolonged decel that dropped into the 70s.  She has known fetal growth restriction and AEDF.   She therapeutic on steroids.      FHTs have recovered, but still concerning.  Discussed with MFM as to recommendation and based on therapeutic and the known problems, he recommended proceeding with delivery.    NICU Notified  Awaiting for her  to come in and will proceed with RLTCS        Electronically signed by Esvin Campbell MD at 21 2310     Viviane Rivera MD at 21 1041          [facility]  Maternal-Fetal Medicine  Progress Note    HD#3       Chief Complaint:  Chief Complaint   Patient " "presents with   • Elevated Blood Pressure       30 year old  at 31 weeks 5 days gestation admitted with CHTN, concern for superimposed preeclampsia, FGR with AEDF. Has received betamethasone. Normal 24 hour urine. Blood pressures well controlled on current regimen. Magnsium sulfate off this AM       Subjective     Patient feels well. No HA, vision changes, RUQ pain.     Objective     Vital Signs Range for the last 24 hours  Temp:  [97.6 °F (36.4 °C)-98.3 °F (36.8 °C)] 97.6 °F (36.4 °C)   Temp src: Oral   BP: (120-153)/(73-91) 146/90   Heart Rate:  [73-90] 82   Resp:  [16-18] 16   SpO2:  [96 %-99 %] 98 %       Device (Oxygen Therapy): room air           Flowsheet Rows      First Filed Value   Admission Height  167.6 cm (66\") Documented at 2021 1711   Admission Weight  87.5 kg (193 lb) Documented at 2021 1711          Intake/Output last 24 hours:      Intake/Output Summary (Last 24 hours) at 2021 1041  Last data filed at 2021 0733  Gross per 24 hour   Intake 1355 ml   Output 4050 ml   Net -2695 ml       Intake/Output this shift:    No intake/output data recorded.    Physical Exam:      NAD   Normal pulmonary effort   Abdomen gravid, nontender   Trace edema     Fetal Heart Rate Assessment     , +accels, occasional late appearing decel, mod BTBV     Uterine Assessment           Ctx Count in 10 min:     Duration:     Intensity: Contraction Intensity: no contractions   Intensity by IUPC:     Resting Tone: Uterine Resting Tone: soft by palpation   Resting Tone by IUPC:     Stanford Units:       Ultrasound   See Viewpoint   Normal fluid  BPP 8/8   UA doppler IAEDF       Medications:  labetalol, 200 mg, Oral, Q8H  labetalol, , ,   sodium chloride, 10 mL, Intravenous, Q12H       •  acetaminophen  •  diphenhydrAMINE  •  lidocaine PF 1%  •  ondansetron **OR** ondansetron  •  sodium chloride    Labs:  Lab Results   Component Value Date    HGB 13.7 2021     CBC:   Lab Results   Component " Value Date     2021    HGB 13.7 2021    HCT 39.8 2021    WBC 7.39 2021     Pre-eclampsia Panel:   Lab Results   Component Value Date    ALKPHOS 148 (H) 2021    AST 23 2021    ALT 15 2021    BILITOT 0.4 2021     2021    URICACID 6.8 (H) 2021    CREATININE 0.60 2021    CREATININE 0.60 2021     24 hour urine results:   Lab Results   Component Value Date    URINEVOLUME 3,900 2021    IFQEPMT32KC 222.3 (H) 2021    CRCLEARANCE 105.8 2021         Assessment/Plan       Chronic hypertension with superimposed preeclampsia    Chronic hypertension in pregnancy    Previous  section    IUGR (intrauterine growth restriction) affecting care of mother, third trimester, fetus 1      30 year old  at 31 weeks 5 days gestation admitted with CHTN, FGR with AEDF   Overall reassuring fetal status today   Has ruled out for preeclampsia at this time with normal 24 hour urine, normal PIH labs   Blood pressures currently well controlled   Will plan continued inpatient management with TID NSTs, CEFM as needed   Repeat UA dopplers on 21   Discussed with patient that if UA dopplers remain absent but fetal testing otherwise remains reassuring, delivery at 33 - 34 weeks is appropriate     Viviane Rivera MD  2021  10:41 EDT      Electronically signed by Viviane Rivera MD at 21 1058

## 2021-08-09 NOTE — ANESTHESIA POSTPROCEDURE EVALUATION
Patient: Gregoria Miguel    Procedure Summary     Date: 08/08/21 Room / Location:     Anesthesia Start:  Anesthesia Stop:     Procedure: EPIDURAL BLOOD PATCH Diagnosis:     Scheduled Providers:  Provider:     Anesthesia Type: epidural ASA Status: 3 - Emergent          Anesthesia Type: epidural    Vitals  No vitals data found for the desired time range.          Post Anesthesia Care and Evaluation    Patient location during evaluation: bedside  Patient participation: complete - patient participated  Level of consciousness: awake and alert  Pain management: adequate  Airway patency: patent  Anesthetic complications: No anesthetic complications    Cardiovascular status: acceptable  Respiratory status: acceptable  Hydration status: acceptable  Post Neuraxial Block status: Motor and sensory function returned to baseline and No signs or symptoms of PDPH  Comments: Patient states her headache is 0/10.

## 2021-08-09 NOTE — PROGRESS NOTES
"  Laborist Post Partum Note: Postpartum Day: #2     Patient Name:  Gregoria Miguel  :  1990  MRN:  [id number]  Referring Physician: Lawrence Ford     Chief complaint:   Chief Complaint   Patient presents with   • Elevated Blood Pressure        S.  Patient denies any headaches since receiving epidural blood patch.  Also denies scotomata or epigastric pain.    O.     Vitals:    08/10/21 1205   BP: 140/89   Pulse: 79   Resp:    Temp:    SpO2:        Examination              Chest: Clear to auscultation.  Normal air movement.              Heart: Regular rate and rhythm without murmurs, gallops or rubs.              Abdomen: Incision clean, dry, and intact with subcuticular suture in place   Extremities: No calf tenderness.       A.    Postop day #2 status post  section for gestational hypertension.  Continues to have intermittent blood pressure elevation.      P.  Continue with labetalol 300 mg orally every 8 hours.  Will rely on as needed nifedipine for control of severe range hypertension.  If frequent blood pressure elevation noted will proceed with scheduled nifedipine XL.    Oracio Luu \"Maritza\" TREVON De La Rosa MD  12:24 EDT  08/10/21  "

## 2021-08-10 VITALS
HEART RATE: 79 BPM | BODY MASS INDEX: 31.02 KG/M2 | SYSTOLIC BLOOD PRESSURE: 140 MMHG | HEIGHT: 66 IN | TEMPERATURE: 97.9 F | WEIGHT: 193 LBS | DIASTOLIC BLOOD PRESSURE: 89 MMHG | RESPIRATION RATE: 18 BRPM | OXYGEN SATURATION: 100 %

## 2021-08-10 PROCEDURE — 0503F POSTPARTUM CARE VISIT: CPT | Performed by: OBSTETRICS & GYNECOLOGY

## 2021-08-10 RX ORDER — OXYCODONE HYDROCHLORIDE 10 MG/1
5 TABLET ORAL EVERY 4 HOURS PRN
Qty: 8 TABLET | Refills: 0 | Status: SHIPPED | OUTPATIENT
Start: 2021-08-10 | End: 2021-08-14

## 2021-08-10 RX ORDER — NIFEDIPINE 30 MG/1
30 TABLET, FILM COATED, EXTENDED RELEASE ORAL EVERY 12 HOURS SCHEDULED
Qty: 60 TABLET | Refills: 1 | Status: SHIPPED | OUTPATIENT
Start: 2021-08-10

## 2021-08-10 RX ADMIN — IBUPROFEN 600 MG: 600 TABLET ORAL at 02:35

## 2021-08-10 RX ADMIN — LABETALOL HYDROCHLORIDE 300 MG: 300 TABLET, FILM COATED ORAL at 06:32

## 2021-08-10 RX ADMIN — LABETALOL HYDROCHLORIDE 300 MG: 300 TABLET, FILM COATED ORAL at 00:00

## 2021-08-10 RX ADMIN — IBUPROFEN 600 MG: 600 TABLET ORAL at 14:11

## 2021-08-10 RX ADMIN — ACETAMINOPHEN 650 MG: 325 TABLET, FILM COATED ORAL at 11:57

## 2021-08-10 RX ADMIN — IBUPROFEN 600 MG: 600 TABLET ORAL at 08:20

## 2021-08-10 RX ADMIN — ACETAMINOPHEN 650 MG: 325 TABLET, FILM COATED ORAL at 06:30

## 2021-08-10 RX ADMIN — PRENATAL VITAMINS-IRON FUMARATE 27 MG IRON-FOLIC ACID 0.8 MG TABLET 1 TABLET: at 08:20

## 2021-08-10 RX ADMIN — NIFEDIPINE 30 MG: 30 TABLET, FILM COATED, EXTENDED RELEASE ORAL at 02:35

## 2021-08-10 RX ADMIN — ACETAMINOPHEN 650 MG: 325 TABLET, FILM COATED ORAL at 00:00

## 2021-08-10 RX ADMIN — NIFEDIPINE 30 MG: 30 TABLET, FILM COATED, EXTENDED RELEASE ORAL at 08:20

## 2021-08-10 NOTE — LACTATION NOTE
P4P pump retrieved from NICU using Covid precautions. DC planned for today. Encouraged to continue to double pump every 3hr. Reviewed NICU breastmilk storage guidelines. JASMYN

## 2021-08-10 NOTE — DISCHARGE SUMMARY
"Discharge Summary    Patient Name: Gregoria Miguel  : 1990  MRN: [id number]  Date of Service: 8/10/2021  Referring Provider: Lawrence Ford  Discharge Provider: Oracio Luu \"Maritza\" TREVON De La Rosa MD    Date of Admission: 2021    Date of Discharge:  8/10/2021         Admission Diagnosis: Chronic hypertension with superimposed preeclampsia [O11.9]     Discharge Diagnosis: at 31w6d    Procedures:  , Low Transverse     2021    12:55 AM      Hospital Course:  Patient is a 30 y.o. female  status post repeat lower segment transverse  section without complication at 31w6d.  She had been admitted with chronic hypertension and superimposed preeclampsia.  Ultrasound confirmed IUGR with absent end-diastolic flow.  Patient developed repetitive episodes of fetal heart rate deceleration and a decision was made to proceed with repeat  section.  Postpartum course was notable in that her  was diagnosed with COVID-19 on 2021.  The patient's initial testing has been negative.  She is advised to remain quarantined until she has had a second negative test 5 days subsequent to the first.  She remained afebrile, with vital signs stable. She was ready for discharge on postpartum day 3.     Infant:   male  fetus 1260 g (2 lb 12.4 oz)  with Apgar scores of 4 , 8  at five minutes.    Discharge Condition: Stable    Discharge to: Home    Discharge Medications:      Discharge Medications      New Medications      Instructions Start Date   NIFEdipine CC 30 MG 24 hr tablet  Commonly known as: ADALAT CC   30 mg, Oral, Every 12 Hours Scheduled      oxyCODONE 5 MG immediate release tablet  Commonly known as: ROXICODONE   5 mg, Oral, Every 4 Hours PRN         Changes to Medications      Instructions Start Date   labetalol 300 MG tablet  Commonly known as: NORMODYNE  What changed:   · how much to take  · additional instructions   300 mg, Oral, Every 8 Hours Scheduled         Continue These " "Medications      Instructions Start Date   citalopram 20 MG tablet  Commonly known as: CeleXA   20 mg, Oral, Daily      docusate sodium 100 MG capsule   100 mg, Oral, 2 Times Daily PRN      ibuprofen 600 MG tablet  Commonly known as: ADVIL,MOTRIN   600 mg, Oral, Every 6 Hours PRN      Prenatal 27-1 27-1 MG tablet tablet   1 tablet, Oral, Daily         Stop These Medications    aspirin 81 MG EC tablet            Discharge Diet: Regular diet    Discharge Activity: No driving until no longer taking narcotics, Pelvic rest x 6 weeks (nothing in the vagina, no intercourse, tampons, douches) , No lifting >5 lbs for 6 weeks, No strenuous activity and Again the patient is advised to remain quarantined until she has had a second negative Covid test 5 days subsequent to the first.     Follow up appointments: Patient should have a blood pressure check with her primary obstetrician in 5-7 days.    Contraception: Patient is advised to remain at pelvic rest until after her 6-week postpartum appointment.  She is aware to use condoms for backup contraception if she resumes intercourse prior to that time.  She will discuss her final contraceptive plans with her primary obstetrician after her 6-week postpartum exam.    Oracio Luu \"Maritza\" TREVON De La Rosa MD  8/10/2021 12:24 EDT  "

## 2021-08-10 NOTE — NURSING NOTE
RN consulted with Mother Baby Director and Infectious Disease on the plan of care for this mother who had a  presumptive negative COVID swab on 8/9/2021 and FOB had a positive swab. It was determined by infectious disease that the mother should wait to be re-swabbed until after at least 5 days of quarantining at home. Mother verbalized understanding.

## 2021-08-11 NOTE — PAYOR COMM NOTE
"Gregoria Miguel (30 y.o. Female)     Auth#[id number]    Discharged 8/10/21.  Needs 4 additional days approved.    From: Swetha Crenshaw  #[phone]  Fax#[phone]      Date of Birth Social Security Number Address Home Phone MRN    1990  [address] [place] [address] [phone] [id number]    Rastafarian Marital Status          None        Admission Date Admission Type Admitting Provider Attending Provider Department, Room/Bed    21 Elective Major Castaneda DO  [facility] MOTHER BABY 4B, N429/1    Discharge Date Discharge Disposition Discharge Destination        8/10/2021 Home or Self Care              Attending Provider: (none)   Allergies: Penicillins    Isolation: None   Infection: None   Code Status: Prior    Ht: 167.6 cm (66\")   Wt: 87.5 kg (193 lb)    Admission Cmt: None   Principal Problem: Chronic hypertension with superimposed preeclampsia [O11.9]                 Active Insurance as of 2021     Primary Coverage     Payor Plan Insurance Group Employer/Plan Group    [employer]HE[employer] BLUE CROSS AN[employer]M BLUE CROSS BLUE SHIELD PPO [id number]     Payor Plan Address Payor Plan Phone Number Payor Plan Fax Number Effective Dates    [address] [phone]  2019 - None Entered    [facility] [id number]       Subscriber Name Subscriber Birth Date Member ID       GIOVANNI MIGUEL 1989 [id number]                 Emergency Contacts      (Rel.) Home Phone Work Phone Mobile Phone    Elda Lorenzo (Mother) -- -- [phone]    MIGUELMAAME HEMPHILLD (Spouse) [phone] -- [phone]               History & Physical      Major Castaneda DO at 21 [address] [place] [address]  Obstetric History and Physical    Referring Provider: Lawrence Ford MD      Chief Complaint   Patient presents with   • Elevated Blood Pressure       Subjective     Patient is a 30 y.o. female  currently at 31w3d, who presents as a transfer from [place] for severe " range hypertension, fetus with IUGR.( 10%) and absent end-diastolic flow.   course complicated by chronic hypertension,  delivery x2, prior  x1, and prior pregnancy with preeclampsia.  Patient was seen by provider for  visit and  interval growth scan.  Fetus at the 10th percentile with absent end-diastolic flow. Blood pressure reported 160/100.  Patient was sent to labor and delivery.  Blood pressure remained in the severe range therefore she was started on magnesium sulfate and given her first dose of betamethasone.  Transferred to [facility] due to early gestation and NICU capabilities.  Patient currently denies any complaints.  Patient denies headache, nausea, vomiting, leaking of fluid, vaginal bleeding, anorexia, fever, recent trauma or any others so systems or concerns.  Patient reports normal fetal activity.  Patient daily medications include baby aspirin daily, labetalol 200 mg at breakfast 100 mg at lunch and evening.    Her prenatal care is complicated by  hypertension  chronic hypertension, prior   desires repeat  and abnormal fetal growth  IUGR.  Her previous obstetric/gynecological history is remarkable for .    The following portions of the patients history were reviewed and updated as appropriate: current medications, allergies, past medical history, past surgical history, past family history, past social history and problem list .       Prenatal Information:   Maternal Prenatal Labs  Blood Type No results found for: ABO   Rh Status No results found for: RH   Antibody Screen No results found for: ABSCRN   Gonnorhea No results found for: GCCX   Chlamydia No results found for: CLAMYDCU   RPR No results found for: RPR   Syphilis Antibody No results found for: SYPHILIS   Rubella No results found for: RUBELLAIGGIN   Hepatitis B Surface Antigen No results found for: HEPBSAG   HIV-1 Antibody No results found for: LABHIV1   Hepatitis C Antibody  No results found for: HEPCAB   Rapid Urin Drug Screen No results found for: AMPMETHU, BARBITSCNUR, LABBENZSCN, LABMETHSCN, LABOPIASCN, THCURSCR, COCAINEUR, AMPHETSCREEN, PROPOXSCN, BUPRENORSCNU, METAMPSCNUR, OXYCODONESCN, TRICYCLICSCN   Group B Strep Culture No results found for: GBSANTIGEN           External Prenatal Results     Pregnancy Outside Results - Transcribed From Office Records - See Scanned Records For Details     Test Value Date Time    ABO  O  19 170    Rh  Positive  19 1702    Antibody Screen  Negative  19 170    Varicella IgG       Rubella       Hgb  12.2 g/dL 19 0734    Hct  38.4 % 19 0734    Glucose Fasting GTT       Glucose Tolerance Test 1 hour       Glucose Tolerance Test 3 hour       Gonorrhea (discrete)       Chlamydia (discrete)       RPR       VDRL       Syphilis Antibody       HBsAg       Herpes Simplex Virus PCR       Herpes Simplex VIrus Culture       HIV       Hep C RNA Quant PCR       Hep C Antibody       AFP       Group B Strep       GBS Susceptibility to Clindamycin       GBS Susceptibility to Erythromycin       Fetal Fibronectin       Genetic Testing, Maternal Blood             Drug Screening     Test Value Date Time    Urine Drug Screen       Amphetamine Screen       Barbiturate Screen       Benzodiazepine Screen       Methadone Screen       Phencyclidine Screen       Opiates Screen       THC Screen       Cocaine Screen       Propoxyphene Screen       Buprenorphine Screen       Methamphetamine Screen       Oxycodone Screen       Tricyclic Antidepressants Screen             Legend    ^: Historical                          Past OB History:       OB History    Para Term  AB Living   5 3 1 2 1 3   SAB TAB Ectopic Molar Multiple Live Births   0 0 0 0 0 3      # Outcome Date GA Lbr Timur/2nd Weight Sex Delivery Anes PTL Lv   5 Current            4  19 35w2d  2300 g (5 lb 1.1 oz) M CS-LTranv Spinal N OMER      Name: NIRMAL ALY       Apgar1: 8  Apgar5: 9   3 Term 12/10/14 39w0d  3714 g (8 lb 3 oz) M CS-LVertical Spinal N OMER      Complications: Breech presentation   2  12 36w0d  3033 g (6 lb 11 oz) M Vag-Spont EPI Y OMER   1 AB 11 5w0d    SAB         Obstetric Comments   FOB 1, G1,2   Fob 2, G3,4,5       Past Medical History: Past Medical History:   Diagnosis Date   • Hypertension       Past Surgical History Past Surgical History:   Procedure Laterality Date   •  SECTION     •  SECTION N/A 2019    Procedure:  SECTION REPEAT;  Surgeon: Kaylee Mendoza MD;  Location: [facility] LABOR DELIVERY;  Service: Obstetrics/Gynecology   • LAPAROSCOPIC CHOLECYSTECTOMY     • WISDOM TOOTH EXTRACTION        Family History: No family history on file.   Social History:  reports that she has never smoked. She has never used smokeless tobacco.   reports no history of alcohol use.   reports no history of drug use.                   General ROS Negative Findings:Headaches, Visual Changes, Epigastric pain, Anorexia, Nausia/Vomiting, ROM and Vaginal Bleeding    ROS     All other systems have been reviewed and are neg  Objective       Vital Signs Range for the last 24 hours  Temperature:     Temp Source:     BP:     Pulse:     Respirations:     SPO2:     O2 Amount (l/min):     O2 Devices     Weight: Weight:  [87.5 kg (193 lb)] 87.5 kg (193 lb)     Physical Examination:   General:   alert, appears stated age and cooperative   Skin:   normal   HEENT:  Sclera clear   Lungs:   clear to auscultation bilaterally   Heart:   regular rate and rhythm, S1, S2 normal, no murmur, click, rub or gallop   Gastrointestinal:  Abdomen soft, gravid uterus, guarding benign exam   Lower Extremities:  No edema, no calf tenderness   : Exam deferred.   Musculoskeletal:   No deformities, full range of motion upper lower extremity   Neuro:  No focal deficit, DTR 2+ 4 no clonus               Fetal Heart Rate Assessment   Method:     Beats/min:      Baseline:     Varibility:     Accels:     Decels:     Tracing Category:       Uterine Assessment   Method:     Frequency (min):     Ctx Count in 10 min:     Duration:     Intensity:     Intensity by IUPC:     Resting Tone:     Resting Tone by IUPC:     [place] Units:       Laboratory Results:   Lab Results (last 24 hours)     ** No results found for the last 24 hours. **        Radiology Review:   Imaging Results (Last 24 Hours)     ** No results found for the last 24 hours. **        Other Studies:    Assessment/Plan       Chronic hypertension with superimposed preeclampsia        Assessment:  1.  Intrauterine pregnancy at 31w3d weeks gestation with reactive fetal status.    2.  Chronic hypertension with superimposed preeclampsia  3.  IUGR 10th percentile with absent end-diastolic flow by ultrasound in [facility]  4.  Prior  x2  5.  History of preeclampsia with prior pregnancy    Plan:  1.  Admit, labs, control blood pressure, continue magnesium sulfate antiseizure prophylaxis, complete steroids for fetal benefit, NICU consult, PDC ultrasound in morning, and  continuous EFM  2. Plan of care has been reviewed with patient.  3.  Risks, benefits of treatment plan have been discussed.  4.  All questions have been answered.  5      Major Castaneda DO  2021  17:12 EDT    Electronically signed by Major Castaneda DO at 21     H&P signed by [employer], [place] at 21      Scan on 2021 by [employer], [employer]: H&amp;P, PRENATAL RECORDS, 2021          Electronically signed by [employer], [employer] at 21          Operative/Procedure Notes (last 7 days) (Notes from 21 0803 through 21 0803)      Esvin Campbell MD at 21 0137          [facility]  Gregoria Miguel  : 1990  MRN: [id number]  CSN: [id number]     Referring Provider: Lawrence Ford         Section Operative Note    Pre-Operative Dx: 1.   Intrauterine  pregnancy at 31w6d weeks 2.   Non-reassuring fetal status  3. IUGR   4. AEDF      Postoperative dx:  1.   Intrauterine pregnancy at 31w6d weeks 2.   Same           Procedure: Repeat  (LTCS)       Surgeon: Esvin Campbell MD   Assistant: Chen Mcgrath       Anesthesia: Spinal        EBL: 350 mls.   IV Fluids: 1400 mls.   UOP: 800 mls.     Antibiotics: cefazolin 2 gms     Infant:      Name:  Shukri      Gender: male  infant    Weight: 1260 g (2 lb 12.4 oz)     Apgars: 4  @ 1 minute / 8  @ 5 minutes     Procedure Details:   After the patient was adequately anesthetized, she was sterilely prepped and draped in the dorsal supine left lateral tilt position. A Pfannenstiel incision was created sharply with the knife. It was carried down to the fascia with the Bovie.  The fascia was cut transversely with the knife and extended in a curvilinear fashion with scissors.   This had some element of difficulty due to increased scar tissue. The fascia was freed from its midline insertion superiorly and inferiorly. Rectus muscles were  in the midline. The peritoneum was sharply entered and a bladder flap was not sharply created. The lower uterine segment was scored transversely with the knife. Clear amniotic fluid was seen. The infant's head was delivered atraumatically. The mouth and nose were bulb suctioned. The cord was quickly milked 4 times prior to being clamped and cut. The infant was handed to the delivery team which was in attendance. The placenta ws challenging to remove requiring manual shearing to be extracted. The uterus was thoroughly checked for placenta fragments and after good visualization the uterus was clear of products of conception.  The uterus was exteriorized and wiped free of debris and clot. The uterine incision was closed with #1 Monocryl in a continuous running locking fashion. A second #1 Monocryl was used to imbricate across the first.  A third #1 Monocryl was used to close the serosa due  "to the thickened lower segment    The uterus was returned to the abdomen. The paracolic gutters were cleared of debris and clot.  The lower area above the bladder was raw and although had some improvement in oozing blood, required surgicel snow for hemostasis.  The fascia was closed with 0 PDS. The subcutaneous tissue was copiously irrigated. Subcutaneous tissue was reapproximated with 3-0 Plain Gut and the skin was closed with Insorb staples subcuticularly. An Exofin dressing was applied followed by a pressure dressing due to the increased oozing of blood.  All counts were correct.         Complications:   None       Disposition:   Mother to Mother Baby/Postpartum  in stable condition currently.   Baby to NICU  in stable condition currently.       Esvin Campbell MD  2021  01:38 EDT          Electronically signed by Esvin Campbell MD at 21 0146          Discharge Summary      Oracio De La Rosa III, MD at 08/10/21 1207          Discharge Summary    Patient Name: Gregoria Miguel  : 1990  MRN: [id number]  Date of Service: 8/10/2021  Referring Provider: Lawrence Ford  Discharge Provider: Oracio Luu \"[place]\" TREVON De La Rosa MD    Date of Admission: 2021    Date of Discharge:  8/10/2021         Admission Diagnosis: Chronic hypertension with superimposed preeclampsia [O11.9]     Discharge Diagnosis: at 31w6d    Procedures:  , Low Transverse     2021    12:55 AM      Hospital Course:  Patient is a 30 y.o. female  status post repeat lower segment transverse  section without complication at 31w6d.  She had been admitted with chronic hypertension and superimposed preeclampsia.  Ultrasound confirmed IUGR with absent end-diastolic flow.  Patient developed repetitive episodes of fetal heart rate deceleration and a decision was made to proceed with repeat  section.  Postpartum course was notable in that her  was diagnosed with COVID-19 on 2021.  The patient's " initial testing has been negative.  She is advised to remain quarantined until she has had a second negative test 5 days subsequent to the first.  She remained afebrile, with vital signs stable. She was ready for discharge on postpartum day 3.     Infant:   male  fetus 1260 g (2 lb 12.4 oz)  with Apgar scores of 4 , 8  at five minutes.    Discharge Condition: Stable    Discharge to: Home    Discharge Medications:      Discharge Medications      New Medications      Instructions Start Date   NIFEdipine CC 30 MG 24 hr tablet  Commonly known as: ADALAT CC   30 mg, Oral, Every 12 Hours Scheduled      oxyCODONE 5 MG immediate release tablet  Commonly known as: ROXICODONE   5 mg, Oral, Every 4 Hours PRN         Changes to Medications      Instructions Start Date   labetalol 300 MG tablet  Commonly known as: NORMODYNE  What changed:   · how much to take  · additional instructions   300 mg, Oral, Every 8 Hours Scheduled         Continue These Medications      Instructions Start Date   citalopram 20 MG tablet  Commonly known as: CeleXA   20 mg, Oral, Daily      docusate sodium 100 MG capsule   100 mg, Oral, 2 Times Daily PRN      ibuprofen 600 MG tablet  Commonly known as: ADVIL,MOTRIN   600 mg, Oral, Every 6 Hours PRN      Prenatal 27-1 27-1 MG tablet tablet   1 tablet, Oral, Daily         Stop These Medications    aspirin 81 MG EC tablet            Discharge Diet: Regular diet    Discharge Activity: No driving until no longer taking narcotics, Pelvic rest x 6 weeks (nothing in the vagina, no intercourse, tampons, douches) , No lifting >5 lbs for 6 weeks, No strenuous activity and Again the patient is advised to remain quarantined until she has had a second negative Covid test 5 days subsequent to the first.     Follow up appointments: Patient should have a blood pressure check with her primary obstetrician in 5-7 days.    Contraception: Patient is advised to remain at pelvic rest until after her 6-week postpartum  "appointment.  She is aware to use condoms for backup contraception if she resumes intercourse prior to that time.  She will discuss her final contraceptive plans with her primary obstetrician after her 6-week postpartum exam.    Oracio Luu \"[place]\" TREVON De La Rosa MD  8/10/2021 12:24 EDT    Electronically signed by Oracio De La Rosa III, MD at 08/10/21 1224       "

## 2021-08-23 ENCOUNTER — APPOINTMENT (OUTPATIENT)
Dept: WOMENS IMAGING | Facility: HOSPITAL | Age: 31
End: 2021-08-23

## (undated) DEVICE — SYS SKIN CLS DERMABOND PRINEO W/22CM MESH TP

## (undated) DEVICE — SUT PDS 0 CTX 36IN DYED Z370T

## (undated) DEVICE — MAT PREVALON MOBL TRANSFR AIR WO/PAD 39X80IN

## (undated) DEVICE — SUT MNCRYL 3/0 27L Y523H BX/36

## (undated) DEVICE — 3M™ STERI-STRIP™ REINFORCED ADHESIVE SKIN CLOSURES, R1547, 1/2 IN X 4 IN (12 MM X 100 MM), 6 STRIPS/ENVELOPE: Brand: 3M™ STERI-STRIP™

## (undated) DEVICE — TRY SPINE BLCK WHITACRE 25G 3X5IN

## (undated) DEVICE — GLV SURG SENSICARE W/ALOE PF LF 7 STRL

## (undated) DEVICE — ADHS LIQ MASTISOL 2/3ML

## (undated) DEVICE — SOL IRR NACL 0.9PCT BT 1000ML

## (undated) DEVICE — STPLR SKIN SUBCUTICULAR INSORB 2030

## (undated) DEVICE — PROXIMATE RH ROTATING HEAD SKIN STAPLERS (35 WIDE) CONTAINS 35 STAINLESS STEEL STAPLES: Brand: PROXIMATE

## (undated) DEVICE — SUT PLAIN  3/0 CT1 27IN 842H

## (undated) DEVICE — SUT GUT CHRM 1 CTX 36IN 905H

## (undated) DEVICE — PK C/SECT 10

## (undated) DEVICE — GLV SURG BIOGEL LTX PF 7 1/2

## (undated) DEVICE — SUT VIC 3/0 PS2 27IN J427H

## (undated) DEVICE — SOL IRR H2O BTL 1000ML STRL

## (undated) DEVICE — VIOLET BRAIDED (POLYGLACTIN 910), SYNTHETIC ABSORBABLE SUTURE: Brand: COATED VICRYL

## (undated) DEVICE — COATED VICRYL  (POLYGLACTIN 910) SUTURE, VIOLET BRAIDED, STERILE, SYNTHETIC ABSORBABLE SUTURE: Brand: COATED VICRYL

## (undated) DEVICE — SUT VIC 2/0 CT1 27IN J339H BX/36